# Patient Record
Sex: MALE
[De-identification: names, ages, dates, MRNs, and addresses within clinical notes are randomized per-mention and may not be internally consistent; named-entity substitution may affect disease eponyms.]

---

## 2018-08-01 PROBLEM — Z00.00 ENCOUNTER FOR PREVENTIVE HEALTH EXAMINATION: Status: ACTIVE | Noted: 2018-08-01

## 2018-08-15 ENCOUNTER — APPOINTMENT (OUTPATIENT)
Dept: SPINE | Facility: CLINIC | Age: 58
End: 2018-08-15

## 2018-08-30 ENCOUNTER — APPOINTMENT (OUTPATIENT)
Dept: NEUROSURGERY | Facility: CLINIC | Age: 58
End: 2018-08-30
Payer: COMMERCIAL

## 2018-08-30 VITALS
TEMPERATURE: 98 F | RESPIRATION RATE: 18 BRPM | SYSTOLIC BLOOD PRESSURE: 162 MMHG | OXYGEN SATURATION: 99 % | HEIGHT: 78 IN | BODY MASS INDEX: 20.25 KG/M2 | DIASTOLIC BLOOD PRESSURE: 97 MMHG | WEIGHT: 175 LBS | HEART RATE: 58 BPM

## 2018-08-30 DIAGNOSIS — Z82.49 FAMILY HISTORY OF ISCHEMIC HEART DISEASE AND OTHER DISEASES OF THE CIRCULATORY SYSTEM: ICD-10-CM

## 2018-08-30 DIAGNOSIS — Z80.9 FAMILY HISTORY OF MALIGNANT NEOPLASM, UNSPECIFIED: ICD-10-CM

## 2018-08-30 DIAGNOSIS — Z78.9 OTHER SPECIFIED HEALTH STATUS: ICD-10-CM

## 2018-08-30 DIAGNOSIS — Z82.61 FAMILY HISTORY OF ARTHRITIS: ICD-10-CM

## 2018-08-30 DIAGNOSIS — Z87.891 PERSONAL HISTORY OF NICOTINE DEPENDENCE: ICD-10-CM

## 2018-08-30 DIAGNOSIS — Z86.79 PERSONAL HISTORY OF OTHER DISEASES OF THE CIRCULATORY SYSTEM: ICD-10-CM

## 2018-08-30 PROCEDURE — 99245 OFF/OP CONSLTJ NEW/EST HI 55: CPT

## 2018-08-31 PROBLEM — Z78.9 DOES NOT USE ILLICIT DRUGS: Status: ACTIVE | Noted: 2018-08-30

## 2018-08-31 PROBLEM — Z82.49 FAMILY HISTORY OF HEART FAILURE: Status: ACTIVE | Noted: 2018-08-30

## 2018-08-31 PROBLEM — Z82.61 FAMILY HISTORY OF ARTHRITIS: Status: ACTIVE | Noted: 2018-08-30

## 2018-08-31 PROBLEM — Z87.891 FORMER SMOKER: Status: ACTIVE | Noted: 2018-08-30

## 2018-08-31 PROBLEM — Z80.9 FAMILY HISTORY OF MALIGNANT NEOPLASM: Status: ACTIVE | Noted: 2018-08-30

## 2018-08-31 PROBLEM — Z86.79 HISTORY OF HYPERTENSION: Status: RESOLVED | Noted: 2018-08-30 | Resolved: 2018-08-31

## 2018-08-31 RX ORDER — METOPROLOL SUCCINATE 100 MG/1
100 TABLET, EXTENDED RELEASE ORAL
Refills: 0 | Status: ACTIVE | COMMUNITY

## 2018-08-31 RX ORDER — ALPRAZOLAM 0.5 MG/1
0.5 TABLET ORAL
Refills: 0 | Status: ACTIVE | COMMUNITY

## 2018-08-31 RX ORDER — ASPIRIN ENTERIC COATED TABLETS 81 MG 81 MG/1
81 TABLET, DELAYED RELEASE ORAL
Refills: 0 | Status: ACTIVE | COMMUNITY

## 2018-09-24 ENCOUNTER — APPOINTMENT (OUTPATIENT)
Dept: OTOLARYNGOLOGY | Facility: CLINIC | Age: 58
End: 2018-09-24
Payer: COMMERCIAL

## 2018-09-24 VITALS
BODY MASS INDEX: 23.7 KG/M2 | HEART RATE: 67 BPM | OXYGEN SATURATION: 99 % | SYSTOLIC BLOOD PRESSURE: 180 MMHG | WEIGHT: 175 LBS | HEIGHT: 72 IN | DIASTOLIC BLOOD PRESSURE: 110 MMHG

## 2018-09-24 PROCEDURE — 31231 NASAL ENDOSCOPY DX: CPT

## 2018-09-24 PROCEDURE — 99204 OFFICE O/P NEW MOD 45 MIN: CPT | Mod: 25

## 2018-10-08 ENCOUNTER — FORM ENCOUNTER (OUTPATIENT)
Age: 58
End: 2018-10-08

## 2018-10-09 ENCOUNTER — OUTPATIENT (OUTPATIENT)
Dept: OUTPATIENT SERVICES | Facility: HOSPITAL | Age: 58
LOS: 1 days | End: 2018-10-09
Payer: COMMERCIAL

## 2018-10-09 ENCOUNTER — APPOINTMENT (OUTPATIENT)
Dept: CT IMAGING | Facility: HOSPITAL | Age: 58
End: 2018-10-09

## 2018-10-09 PROCEDURE — 70486 CT MAXILLOFACIAL W/O DYE: CPT

## 2018-10-09 PROCEDURE — 70486 CT MAXILLOFACIAL W/O DYE: CPT | Mod: 26

## 2018-11-09 RX ORDER — RAMIPRIL 5 MG/1
CAPSULE ORAL
Refills: 0 | Status: ACTIVE | COMMUNITY

## 2018-11-09 RX ORDER — HYDROCHLOROTHIAZIDE 12.5 MG/1
TABLET ORAL
Refills: 0 | Status: ACTIVE | COMMUNITY

## 2018-11-12 RX ORDER — INFLUENZA VIRUS VACCINE 15; 15; 15; 15 UG/.5ML; UG/.5ML; UG/.5ML; UG/.5ML
0.5 SUSPENSION INTRAMUSCULAR ONCE
Refills: 0 | Status: DISCONTINUED | OUTPATIENT
Start: 2018-11-13 | End: 2018-11-15

## 2018-11-13 ENCOUNTER — RESULT REVIEW (OUTPATIENT)
Age: 58
End: 2018-11-13

## 2018-11-13 ENCOUNTER — INPATIENT (INPATIENT)
Facility: HOSPITAL | Age: 58
LOS: 1 days | Discharge: HOME | DRG: 615 | End: 2018-11-15
Attending: NEUROLOGICAL SURGERY | Admitting: NEUROLOGICAL SURGERY
Payer: COMMERCIAL

## 2018-11-13 ENCOUNTER — APPOINTMENT (OUTPATIENT)
Dept: OTOLARYNGOLOGY | Facility: HOSPITAL | Age: 58
End: 2018-11-13

## 2018-11-13 VITALS
DIASTOLIC BLOOD PRESSURE: 89 MMHG | TEMPERATURE: 97 F | HEIGHT: 70 IN | HEART RATE: 63 BPM | SYSTOLIC BLOOD PRESSURE: 158 MMHG | WEIGHT: 177.47 LBS | RESPIRATION RATE: 18 BRPM

## 2018-11-13 DIAGNOSIS — D35.2 BENIGN NEOPLASM OF PITUITARY GLAND: ICD-10-CM

## 2018-11-13 DIAGNOSIS — Z90.89 ACQUIRED ABSENCE OF OTHER ORGANS: Chronic | ICD-10-CM

## 2018-11-13 DIAGNOSIS — Z95.1 PRESENCE OF AORTOCORONARY BYPASS GRAFT: Chronic | ICD-10-CM

## 2018-11-13 LAB
ALBUMIN SERPL ELPH-MCNC: 4.3 G/DL — SIGNIFICANT CHANGE UP (ref 3.3–5)
ALP SERPL-CCNC: 62 U/L — SIGNIFICANT CHANGE UP (ref 40–120)
ALT FLD-CCNC: 27 U/L — SIGNIFICANT CHANGE UP (ref 10–45)
ANION GAP SERPL CALC-SCNC: 16 MMOL/L — SIGNIFICANT CHANGE UP (ref 5–17)
AST SERPL-CCNC: 24 U/L — SIGNIFICANT CHANGE UP (ref 10–40)
BASOPHILS NFR BLD AUTO: 0.2 % — SIGNIFICANT CHANGE UP (ref 0–2)
BILIRUB SERPL-MCNC: 0.6 MG/DL — SIGNIFICANT CHANGE UP (ref 0.2–1.2)
BUN SERPL-MCNC: 14 MG/DL — SIGNIFICANT CHANGE UP (ref 7–23)
CALCIUM SERPL-MCNC: 8.9 MG/DL — SIGNIFICANT CHANGE UP (ref 8.4–10.5)
CHLORIDE SERPL-SCNC: 97 MMOL/L — SIGNIFICANT CHANGE UP (ref 96–108)
CO2 SERPL-SCNC: 24 MMOL/L — SIGNIFICANT CHANGE UP (ref 22–31)
CREAT SERPL-MCNC: 0.85 MG/DL — SIGNIFICANT CHANGE UP (ref 0.5–1.3)
EOSINOPHIL NFR BLD AUTO: 0.5 % — SIGNIFICANT CHANGE UP (ref 0–6)
GLUCOSE BLDC GLUCOMTR-MCNC: 152 MG/DL — HIGH (ref 70–99)
GLUCOSE SERPL-MCNC: 141 MG/DL — HIGH (ref 70–99)
HCT VFR BLD CALC: 35.7 % — LOW (ref 39–50)
HGB BLD-MCNC: 12.4 G/DL — LOW (ref 13–17)
INR BLD: 1.1 — SIGNIFICANT CHANGE UP (ref 0.88–1.16)
LYMPHOCYTES # BLD AUTO: 27.3 % — SIGNIFICANT CHANGE UP (ref 13–44)
MCHC RBC-ENTMCNC: 29.8 PG — SIGNIFICANT CHANGE UP (ref 27–34)
MCHC RBC-ENTMCNC: 34.7 G/DL — SIGNIFICANT CHANGE UP (ref 32–36)
MCV RBC AUTO: 85.8 FL — SIGNIFICANT CHANGE UP (ref 80–100)
MONOCYTES NFR BLD AUTO: 7.4 % — SIGNIFICANT CHANGE UP (ref 2–14)
NEUTROPHILS NFR BLD AUTO: 64.6 % — SIGNIFICANT CHANGE UP (ref 43–77)
PLATELET # BLD AUTO: 164 K/UL — SIGNIFICANT CHANGE UP (ref 150–400)
POTASSIUM SERPL-MCNC: 3.8 MMOL/L — SIGNIFICANT CHANGE UP (ref 3.5–5.3)
POTASSIUM SERPL-SCNC: 3.8 MMOL/L — SIGNIFICANT CHANGE UP (ref 3.5–5.3)
PROT SERPL-MCNC: 7 G/DL — SIGNIFICANT CHANGE UP (ref 6–8.3)
PROTHROM AB SERPL-ACNC: 12.5 SEC — SIGNIFICANT CHANGE UP (ref 10–12.9)
RBC # BLD: 4.16 M/UL — LOW (ref 4.2–5.8)
RBC # FLD: 11.6 % — SIGNIFICANT CHANGE UP (ref 10.3–16.9)
SODIUM SERPL-SCNC: 137 MMOL/L — SIGNIFICANT CHANGE UP (ref 135–145)
WBC # BLD: 4.4 K/UL — SIGNIFICANT CHANGE UP (ref 3.8–10.5)
WBC # FLD AUTO: 4.4 K/UL — SIGNIFICANT CHANGE UP (ref 3.8–10.5)

## 2018-11-13 PROCEDURE — 62165 REMOVE PITUIT TUMOR W/SCOPE: CPT | Mod: 80

## 2018-11-13 PROCEDURE — 62165 REMOVE PITUIT TUMOR W/SCOPE: CPT | Mod: 62

## 2018-11-13 PROCEDURE — 70551 MRI BRAIN STEM W/O DYE: CPT | Mod: 26

## 2018-11-13 PROCEDURE — 61782 SCAN PROC CRANIAL EXTRA: CPT

## 2018-11-13 PROCEDURE — 61781 SCAN PROC CRANIAL INTRA: CPT | Mod: 80

## 2018-11-13 PROCEDURE — 61781 SCAN PROC CRANIAL INTRA: CPT

## 2018-11-13 RX ORDER — PANTOPRAZOLE SODIUM 20 MG/1
40 TABLET, DELAYED RELEASE ORAL
Refills: 0 | Status: DISCONTINUED | OUTPATIENT
Start: 2018-11-13 | End: 2018-11-15

## 2018-11-13 RX ORDER — FAMOTIDINE 10 MG/ML
20 INJECTION INTRAVENOUS DAILY
Refills: 0 | Status: DISCONTINUED | OUTPATIENT
Start: 2018-11-13 | End: 2018-11-13

## 2018-11-13 RX ORDER — DEXTROSE MONOHYDRATE, SODIUM CHLORIDE, AND POTASSIUM CHLORIDE 50; .745; 4.5 G/1000ML; G/1000ML; G/1000ML
1000 INJECTION, SOLUTION INTRAVENOUS
Refills: 0 | Status: DISCONTINUED | OUTPATIENT
Start: 2018-11-13 | End: 2018-11-14

## 2018-11-13 RX ORDER — DEXTROSE 50 % IN WATER 50 %
25 SYRINGE (ML) INTRAVENOUS ONCE
Refills: 0 | Status: DISCONTINUED | OUTPATIENT
Start: 2018-11-13 | End: 2018-11-15

## 2018-11-13 RX ORDER — KETOROLAC TROMETHAMINE 30 MG/ML
15 SYRINGE (ML) INJECTION EVERY 4 HOURS
Refills: 0 | Status: DISCONTINUED | OUTPATIENT
Start: 2018-11-13 | End: 2018-11-15

## 2018-11-13 RX ORDER — ACETAMINOPHEN 500 MG
650 TABLET ORAL EVERY 6 HOURS
Refills: 0 | Status: DISCONTINUED | OUTPATIENT
Start: 2018-11-13 | End: 2018-11-15

## 2018-11-13 RX ORDER — SENNA PLUS 8.6 MG/1
2 TABLET ORAL AT BEDTIME
Refills: 0 | Status: DISCONTINUED | OUTPATIENT
Start: 2018-11-13 | End: 2018-11-15

## 2018-11-13 RX ORDER — DEXTROSE 50 % IN WATER 50 %
12.5 SYRINGE (ML) INTRAVENOUS ONCE
Refills: 0 | Status: DISCONTINUED | OUTPATIENT
Start: 2018-11-13 | End: 2018-11-15

## 2018-11-13 RX ORDER — DEXTROSE 50 % IN WATER 50 %
15 SYRINGE (ML) INTRAVENOUS ONCE
Refills: 0 | Status: DISCONTINUED | OUTPATIENT
Start: 2018-11-13 | End: 2018-11-15

## 2018-11-13 RX ORDER — HYDROCORTISONE 20 MG
TABLET ORAL
Refills: 0 | Status: DISCONTINUED | OUTPATIENT
Start: 2018-11-13 | End: 2018-11-15

## 2018-11-13 RX ORDER — CEFAZOLIN SODIUM 1 G
VIAL (EA) INJECTION
Refills: 0 | Status: DISCONTINUED | OUTPATIENT
Start: 2018-11-13 | End: 2018-11-15

## 2018-11-13 RX ORDER — SODIUM CHLORIDE 9 MG/ML
1000 INJECTION, SOLUTION INTRAVENOUS
Refills: 0 | Status: DISCONTINUED | OUTPATIENT
Start: 2018-11-13 | End: 2018-11-15

## 2018-11-13 RX ORDER — METOPROLOL TARTRATE 50 MG
100 TABLET ORAL DAILY
Refills: 0 | Status: DISCONTINUED | OUTPATIENT
Start: 2018-11-13 | End: 2018-11-15

## 2018-11-13 RX ORDER — FENTANYL CITRATE 50 UG/ML
50 INJECTION INTRAVENOUS ONCE
Refills: 0 | Status: DISCONTINUED | OUTPATIENT
Start: 2018-11-13 | End: 2018-11-14

## 2018-11-13 RX ORDER — HYDROCORTISONE 20 MG
50 TABLET ORAL EVERY 8 HOURS
Refills: 0 | Status: DISCONTINUED | OUTPATIENT
Start: 2018-11-14 | End: 2018-11-15

## 2018-11-13 RX ORDER — HYDROCORTISONE 20 MG
25 TABLET ORAL EVERY 8 HOURS
Refills: 0 | Status: DISCONTINUED | OUTPATIENT
Start: 2018-11-15 | End: 2018-11-15

## 2018-11-13 RX ORDER — CEFAZOLIN SODIUM 1 G
1000 VIAL (EA) INJECTION EVERY 8 HOURS
Refills: 0 | Status: DISCONTINUED | OUTPATIENT
Start: 2018-11-14 | End: 2018-11-15

## 2018-11-13 RX ORDER — CEFAZOLIN SODIUM 1 G
1000 VIAL (EA) INJECTION ONCE
Refills: 0 | Status: COMPLETED | OUTPATIENT
Start: 2018-11-13 | End: 2018-11-13

## 2018-11-13 RX ORDER — INSULIN LISPRO 100/ML
VIAL (ML) SUBCUTANEOUS EVERY 6 HOURS
Refills: 0 | Status: DISCONTINUED | OUTPATIENT
Start: 2018-11-13 | End: 2018-11-15

## 2018-11-13 RX ORDER — ATORVASTATIN CALCIUM 80 MG/1
40 TABLET, FILM COATED ORAL AT BEDTIME
Refills: 0 | Status: DISCONTINUED | OUTPATIENT
Start: 2018-11-13 | End: 2018-11-15

## 2018-11-13 RX ORDER — HYDROCORTISONE 20 MG
100 TABLET ORAL EVERY 8 HOURS
Refills: 0 | Status: COMPLETED | OUTPATIENT
Start: 2018-11-13 | End: 2018-11-14

## 2018-11-13 RX ORDER — DOCUSATE SODIUM 100 MG
100 CAPSULE ORAL
Refills: 0 | Status: DISCONTINUED | OUTPATIENT
Start: 2018-11-13 | End: 2018-11-15

## 2018-11-13 RX ORDER — ALPRAZOLAM 0.25 MG
0.5 TABLET ORAL AT BEDTIME
Refills: 0 | Status: DISCONTINUED | OUTPATIENT
Start: 2018-11-13 | End: 2018-11-15

## 2018-11-13 RX ORDER — LISINOPRIL 2.5 MG/1
5 TABLET ORAL DAILY
Refills: 0 | Status: DISCONTINUED | OUTPATIENT
Start: 2018-11-13 | End: 2018-11-14

## 2018-11-13 RX ORDER — HYDROMORPHONE HYDROCHLORIDE 2 MG/ML
0.5 INJECTION INTRAMUSCULAR; INTRAVENOUS; SUBCUTANEOUS
Refills: 0 | Status: DISCONTINUED | OUTPATIENT
Start: 2018-11-13 | End: 2018-11-13

## 2018-11-13 RX ORDER — GLUCAGON INJECTION, SOLUTION 0.5 MG/.1ML
1 INJECTION, SOLUTION SUBCUTANEOUS ONCE
Refills: 0 | Status: DISCONTINUED | OUTPATIENT
Start: 2018-11-13 | End: 2018-11-15

## 2018-11-13 RX ADMIN — HYDROMORPHONE HYDROCHLORIDE 0.5 MILLIGRAM(S): 2 INJECTION INTRAMUSCULAR; INTRAVENOUS; SUBCUTANEOUS at 20:10

## 2018-11-13 RX ADMIN — Medication 15 MILLIGRAM(S): at 22:56

## 2018-11-13 RX ADMIN — Medication 100 MILLIGRAM(S): at 21:52

## 2018-11-13 RX ADMIN — LISINOPRIL 5 MILLIGRAM(S): 2.5 TABLET ORAL at 23:47

## 2018-11-13 RX ADMIN — Medication 100 MILLIGRAM(S): at 23:46

## 2018-11-13 RX ADMIN — Medication 15 MILLIGRAM(S): at 23:15

## 2018-11-13 RX ADMIN — Medication 2: at 23:07

## 2018-11-13 RX ADMIN — ATORVASTATIN CALCIUM 40 MILLIGRAM(S): 80 TABLET, FILM COATED ORAL at 23:47

## 2018-11-13 RX ADMIN — DEXTROSE MONOHYDRATE, SODIUM CHLORIDE, AND POTASSIUM CHLORIDE 75 MILLILITER(S): 50; .745; 4.5 INJECTION, SOLUTION INTRAVENOUS at 21:23

## 2018-11-13 RX ADMIN — HYDROMORPHONE HYDROCHLORIDE 0.5 MILLIGRAM(S): 2 INJECTION INTRAMUSCULAR; INTRAVENOUS; SUBCUTANEOUS at 19:44

## 2018-11-13 NOTE — H&P PST ADULT - PMH
CAD (coronary artery disease) of artery bypass graft    HLD (hyperlipidemia)    HTN (hypertension)    Pituitary macroadenoma

## 2018-11-13 NOTE — H&P PST ADULT - HISTORY OF PRESENT ILLNESS
58 year old male, had recent episode of L facial palsy / Bell's Palsy, work-up found an incidental sellar mass, hormonal work-up negative. Had pre-op visual fields.

## 2018-11-13 NOTE — PROGRESS NOTE ADULT - SUBJECTIVE AND OBJECTIVE BOX
NEUROSURGERY POST OP NOTE:    POD# 0 S/P transphenoidal pituitary tumor resection	    S: Patient reports 8/10 severity headache minimally improved by pain medication. He otherwise denies any visual changes, nasal discharge, extremity weakness, excessive thirst.      T(C): 35.6 (11-13-18 @ 18:10), Max: 35.9 (11-13-18 @ 13:04)  HR: 76 (11-13-18 @ 22:00) (63 - 82)  BP: 150/94 (11-13-18 @ 22:00) (146/93 - 163/93)  RR: 13 (11-13-18 @ 22:00) (4 - 18)  SpO2: 96% (11-13-18 @ 22:00) (96% - 100%)      11-13-18 @ 07:01  -  11-13-18 @ 22:19  --------------------------------------------------------  IN: 300 mL / OUT: 605 mL / NET: -305 mL        acetaminophen   Tablet .. 650 milliGRAM(s) Oral every 6 hours PRN  ALPRAZolam 0.5 milliGRAM(s) Oral at bedtime  atorvastatin 40 milliGRAM(s) Oral at bedtime  ceFAZolin   IVPB      famotidine    Tablet 20 milliGRAM(s) Oral daily  fentaNYL    Injectable 50 MICROGram(s) IV Push once  hydrocortisone sodium succinate Injectable   IV Push   hydrocortisone sodium succinate Injectable 100 milliGRAM(s) IV Push every 8 hours  influenza   Vaccine 0.5 milliLiter(s) IntraMuscular once  ketorolac   Injectable 15 milliGRAM(s) IV Push every 4 hours PRN  lisinopril 5 milliGRAM(s) Oral daily  metoprolol succinate  milliGRAM(s) Oral daily  sodium chloride 0.9% with potassium chloride 20 mEq/L 1000 milliLiter(s) IV Continuous <Continuous>                          12.4   4.4   )-----------( 164      ( 13 Nov 2018 18:47 )             35.7       137  |  97  |  14  ----------------------------<  141<H>  3.8   |  24  |  0.85    Ca    8.9      13 Nov 2018 18:47    TPro  7.0  /  Alb  4.3  /  TBili  0.6  /  DBili  x   /  AST  24  /  ALT  27  /  AlkPhos  62  11-13      Exam: Gen: NAD, AAOx3  HEENT: PERRL. EOMI. VF intact. Right nare packing.  Neck: FROM, nontender  Lungs: Clear b/l  Heart: S1, S2. NSR.  Abd: Soft, hypoactive BS, non-tender  Exts: Pulses 2+ throughout  Neuro: CNs II-XII intact. 5/5 str x4 extremities. Sensation to LT intact. Speech clear. Following commands.      Assessment: 58y Male w/ HTN, HLD, Anxiety, h/o left side Bell's Palsy with outpatient work-up of pituitary macroadenoma now s/p transphenoidal transnasal pituitary tumor resection POD#0.      Plan:  Neuro: Neuro checks,  Pain meds PRN,  MRI Brain within 48 hours,  DI watch,  Hydrocortisone on a taper as ordered,    CV: Normotensive BP goals,  Continue home medications but hold HCTZ  Daily labs, electrolyte repletion PRN    Pulm: No IS, satting well on RA    GI: PO diet as tolerated,  PPI, stool softeners,  IVF until good PO intake,    : Morrow, monitor I&Os    ID: Ancef for duration of nasal packing,    Endo: ISS as ordered,    PPX: SCDs    Dispo: NSICU when bed available for neuro monitoring and DI watch,  PT/OT and OOB tomorrow as tolerated,  D/w Dr. Aquino

## 2018-11-13 NOTE — BRIEF OPERATIVE NOTE - PROCEDURE
Transphenoidal or transnasal resection of pituitary tumor with magnetic resonance imaging guidance  11/13/2018    Active  [id number] <<-----Click on this checkbox to enter Procedure

## 2018-11-14 LAB
ANION GAP SERPL CALC-SCNC: 17 MMOL/L — SIGNIFICANT CHANGE UP (ref 5–17)
BUN SERPL-MCNC: 15 MG/DL — SIGNIFICANT CHANGE UP (ref 7–23)
CALCIUM SERPL-MCNC: 8.9 MG/DL — SIGNIFICANT CHANGE UP (ref 8.4–10.5)
CHLORIDE SERPL-SCNC: 97 MMOL/L — SIGNIFICANT CHANGE UP (ref 96–108)
CO2 SERPL-SCNC: 21 MMOL/L — LOW (ref 22–31)
CREAT SERPL-MCNC: 0.73 MG/DL — SIGNIFICANT CHANGE UP (ref 0.5–1.3)
GLUCOSE BLDC GLUCOMTR-MCNC: 150 MG/DL — HIGH (ref 70–99)
GLUCOSE BLDC GLUCOMTR-MCNC: 175 MG/DL — HIGH (ref 70–99)
GLUCOSE SERPL-MCNC: 169 MG/DL — HIGH (ref 70–99)
HCT VFR BLD CALC: 37.6 % — LOW (ref 39–50)
HGB BLD-MCNC: 12.9 G/DL — LOW (ref 13–17)
MAGNESIUM SERPL-MCNC: 1.7 MG/DL — SIGNIFICANT CHANGE UP (ref 1.6–2.6)
MCHC RBC-ENTMCNC: 29.3 PG — SIGNIFICANT CHANGE UP (ref 27–34)
MCHC RBC-ENTMCNC: 34.3 G/DL — SIGNIFICANT CHANGE UP (ref 32–36)
MCV RBC AUTO: 85.5 FL — SIGNIFICANT CHANGE UP (ref 80–100)
PHOSPHATE SERPL-MCNC: 3.3 MG/DL — SIGNIFICANT CHANGE UP (ref 2.5–4.5)
PLATELET # BLD AUTO: 162 K/UL — SIGNIFICANT CHANGE UP (ref 150–400)
POTASSIUM SERPL-MCNC: 3.8 MMOL/L — SIGNIFICANT CHANGE UP (ref 3.5–5.3)
POTASSIUM SERPL-SCNC: 3.8 MMOL/L — SIGNIFICANT CHANGE UP (ref 3.5–5.3)
RBC # BLD: 4.4 M/UL — SIGNIFICANT CHANGE UP (ref 4.2–5.8)
RBC # FLD: 11.8 % — SIGNIFICANT CHANGE UP (ref 10.3–16.9)
SODIUM SERPL-SCNC: 135 MMOL/L — SIGNIFICANT CHANGE UP (ref 135–145)
WBC # BLD: 8.1 K/UL — SIGNIFICANT CHANGE UP (ref 3.8–10.5)
WBC # FLD AUTO: 8.1 K/UL — SIGNIFICANT CHANGE UP (ref 3.8–10.5)

## 2018-11-14 PROCEDURE — 99233 SBSQ HOSP IP/OBS HIGH 50: CPT | Mod: 24

## 2018-11-14 RX ORDER — ACETAMINOPHEN 500 MG
1000 TABLET ORAL ONCE
Refills: 0 | Status: COMPLETED | OUTPATIENT
Start: 2018-11-14 | End: 2018-11-14

## 2018-11-14 RX ORDER — SODIUM CHLORIDE 9 MG/ML
1000 INJECTION INTRAMUSCULAR; INTRAVENOUS; SUBCUTANEOUS
Refills: 0 | Status: DISCONTINUED | OUTPATIENT
Start: 2018-11-14 | End: 2018-11-15

## 2018-11-14 RX ORDER — FENTANYL CITRATE 50 UG/ML
25 INJECTION INTRAVENOUS EVERY 4 HOURS
Refills: 0 | Status: DISCONTINUED | OUTPATIENT
Start: 2018-11-14 | End: 2018-11-14

## 2018-11-14 RX ORDER — LISINOPRIL 2.5 MG/1
10 TABLET ORAL DAILY
Refills: 0 | Status: DISCONTINUED | OUTPATIENT
Start: 2018-11-15 | End: 2018-11-15

## 2018-11-14 RX ADMIN — PANTOPRAZOLE SODIUM 40 MILLIGRAM(S): 20 TABLET, DELAYED RELEASE ORAL at 07:03

## 2018-11-14 RX ADMIN — Medication 100 MILLIGRAM(S): at 07:03

## 2018-11-14 RX ADMIN — Medication 15 MILLIGRAM(S): at 18:15

## 2018-11-14 RX ADMIN — ATORVASTATIN CALCIUM 40 MILLIGRAM(S): 80 TABLET, FILM COATED ORAL at 22:27

## 2018-11-14 RX ADMIN — Medication 15 MILLIGRAM(S): at 23:00

## 2018-11-14 RX ADMIN — SENNA PLUS 2 TABLET(S): 8.6 TABLET ORAL at 22:26

## 2018-11-14 RX ADMIN — Medication 400 MILLIGRAM(S): at 13:34

## 2018-11-14 RX ADMIN — Medication 2: at 07:17

## 2018-11-14 RX ADMIN — Medication 100 MILLIGRAM(S): at 22:27

## 2018-11-14 RX ADMIN — Medication 1000 MILLIGRAM(S): at 14:04

## 2018-11-14 RX ADMIN — Medication 100 MILLIGRAM(S): at 18:11

## 2018-11-14 RX ADMIN — Medication 15 MILLIGRAM(S): at 22:27

## 2018-11-14 RX ADMIN — Medication 100 MILLIGRAM(S): at 07:02

## 2018-11-14 RX ADMIN — Medication 15 MILLIGRAM(S): at 10:40

## 2018-11-14 RX ADMIN — LISINOPRIL 5 MILLIGRAM(S): 2.5 TABLET ORAL at 07:03

## 2018-11-14 RX ADMIN — Medication 100 MILLIGRAM(S): at 14:00

## 2018-11-14 RX ADMIN — SODIUM CHLORIDE 50 MILLILITER(S): 9 INJECTION INTRAMUSCULAR; INTRAVENOUS; SUBCUTANEOUS at 22:27

## 2018-11-14 RX ADMIN — Medication 0.5 MILLIGRAM(S): at 22:26

## 2018-11-14 RX ADMIN — Medication 50 MILLIGRAM(S): at 22:27

## 2018-11-14 RX ADMIN — Medication 100 MILLIGRAM(S): at 16:00

## 2018-11-14 RX ADMIN — Medication 15 MILLIGRAM(S): at 10:25

## 2018-11-14 NOTE — PHYSICAL THERAPY INITIAL EVALUATION ADULT - MODALITIES TREATMENT COMMENTS
smile symmetrical, tongue midline, eyes open/close raise eyebrows intact. H-test and quadrant test intact.

## 2018-11-14 NOTE — PROGRESS NOTE ADULT - ASSESSMENT
58y/M with  Pituitary macroadenoma  HLD (hyperlipidemia)  HTN (hypertension)  CAD (coronary artery disease) of artery bypass graft      PLAN:   NEURO:  REHAB:  physical therapy evaluation and management    EARLY MOB:      PULM:  Room air, incentive spirometry  CARDIO:  SBP goal 100-150mm Hg  ENDO:  Blood sugar goals 140-180 mg/dL, continue insulin sliding scale  GI:  PPI for GI prophylaxis  DIET:  RENAL:    HEM/ONC:  VTE Prophylaxis:   ID: afebrile, no leukocytosis  Social: will update family    ATTENDING ATTESTATION:  I was physically present for the key portions of the evaluation and management (E/M) service provided.  I agree with the above history, physical and plan, which I have reviewed and edited where appropriate.    Patient at high risk for neurological deterioration or death due to:  ICU delirium, aspiration PNA, DVT / PE.  Critical care time, excluding procedures: 60 minutes spent on total encounter, more than 50% of the visit was spent counseling and/or coordinating care by the attending physician.     Plan discussed with RN, house staff. 58y/M with  1.  Pituitary macroadenoma s/p transphenoidal resection (11/13/2018, Dr. Aquino, Dr. Wiley)  2.  Hypertension dyslipidemia   3.  CAD, s/p CABG    PLAN:   NEURO: neurochecks q4h, PRN pain meds with tylenol, DI watch  s/p resection of pituitary mass:  f/u final histopathology; MRI on stepdown, hydrocortisone taper as per NS  REHAB:  physical therapy evaluation and management    EARLY MOB:  OOB to chair    PULM:  PRN O2 support to keep sats >/=92%, skull base precautions: no nose blowing, drinking with a straw, or bending below waist; NO incentive spirometry   CARDIO:  SBP goal 100-150mm Hg, continue lisinopril, metoprolol  ENDO:  Blood sugar goals 140-180 mg/dL, continue insulin sliding scale  GI:  PPI for GI prophylaxis while on steroids  DIET: advance as tolerated  RENAL:  NS@50cc/hr  HEM/ONC: Hb stable  VTE Prophylaxis: SCDs, no DVT chemoprophylaxis for now as patient is high risk for bleed (fresh post-op), baseline LE Doppler for DVT suspected on admission (pit adenoma)  ID: afebrile, no leukocytosis  Social: will update family    ATTENDING ATTESTATION:  I was physically present for the key portions of the evaluation and management (E/M) service provided.  I agree with the above history, physical and plan which I have reviewed and edited where appropriate.     Patient not at high risk for neurologic deterioration / death.  Time spent on this noncritically ill patient: 45 minutes spent on total encounter, more than 50% of the visit was spent counseling and/or coordinating care by the attending physician.    Plan discussed with RN, house staff. 58y/M with  1.  Pituitary macroadenoma s/p transphenoidal resection (11/13/2018, Dr. Aquino, Dr. Wiley)  2.  Hypertension dyslipidemia   3.  CAD, s/p CABG    PLAN:   NEURO: neurochecks q4h, PRN pain meds with tylenol, DI watch  s/p resection of pituitary mass:  f/u final histopathology; MRI on stepdown, hydrocortisone taper as per NS  REHAB:  physical therapy evaluation and management    EARLY MOB:  OOB to chair    PULM:  PRN O2 support to keep sats >/=92%, skull base precautions: no nose blowing, drinking with a straw, or bending below waist; NO incentive spirometry   CARDIO:  SBP goal 100-150mm Hg, continue lisinopril, metoprolol  ENDO:  Blood sugar goals 140-180 mg/dL, continue insulin sliding scale  GI:  PPI for GI prophylaxis while on steroids  DIET: regular diet  RENAL:  IVL  HEM/ONC: Hb stable  VTE Prophylaxis: SCDs, no DVT chemoprophylaxis for now as patient is high risk for bleed (fresh post-op), baseline LE Doppler for DVT suspected on admission (pit adenoma)  ID: afebrile, no leukocytosis  Social: will update family    ATTENDING ATTESTATION:  I was physically present for the key portions of the evaluation and management (E/M) service provided.  I agree with the above history, physical and plan which I have reviewed and edited where appropriate.     Patient not at high risk for neurologic deterioration / death.  Time spent on this noncritically ill patient: 45 minutes spent on total encounter, more than 50% of the visit was spent counseling and/or coordinating care by the attending physician.    Plan discussed with RN, house staff.

## 2018-11-14 NOTE — PROGRESS NOTE ADULT - SUBJECTIVE AND OBJECTIVE BOX
HPI:  58 year old male, had recent episode of L facial palsy / Bell's Palsy, work-up found an incidental sellar mass, hormonal work-up negative. Had pre-op visual fields. (13 Nov 2018 15:38)    OVERNIGHT EVENTS:    11/14: HA improved with IV toradol, didn't tolerate dilaudid and refused fentanyl; started fioricet prn today for HA     Vital Signs Last 24 Hrs  T(C): 35.7 (14 Nov 2018 05:00), Max: 35.9 (13 Nov 2018 13:04)  T(F): 96.3 (14 Nov 2018 05:00), Max: 96.6 (13 Nov 2018 13:04)  HR: 86 (14 Nov 2018 09:04) (63 - 90)  BP: 159/93 (14 Nov 2018 09:04) (134/97 - 163/93)  BP(mean): 115 (14 Nov 2018 09:04) (110 - 129)  RR: 14 (14 Nov 2018 09:04) (4 - 18)  SpO2: 97% (14 Nov 2018 09:04) (95% - 100%)    I&O's Summary    13 Nov 2018 07:01  -  14 Nov 2018 07:00  --------------------------------------------------------  IN: 950 mL / OUT: 1125 mL / NET: -175 mL    14 Nov 2018 07:01  -  14 Nov 2018 10:35  --------------------------------------------------------  IN: 75 mL / OUT: 40 mL / NET: 35 mL        PHYSICAL EXAM:  AAOX3. Verbal function intact; R merocel packing intact.   No CSF leakage noted   on Supplemental O2 stat, in no distress   Cranial Nerves: II-XII intact  Motor: 5/5 power in b/l UE and LE  Sensation: intact to touch in all extremities  symmetric sensation on both sides of face.   Pronator Drift: none  Morrow a-line removed, voiding  abd: non tender       TUBES/LINES:  [] Morrow  [] Lumbar Drain  [] Wound Drains  [] Others      DIET:  [] NPO  [x] Mechanical  [] Tube feeds    LABS:                        12.9   8.1   )-----------( 162      ( 14 Nov 2018 06:08 )             37.6     11-14    135  |  97  |  15  ----------------------------<  169<H>  3.8   |  21<L>  |  0.73    Ca    8.9      14 Nov 2018 06:08  Phos  3.3     11-14  Mg     1.7     11-14    TPro  7.0  /  Alb  4.3  /  TBili  0.6  /  DBili  x   /  AST  24  /  ALT  27  /  AlkPhos  62  11-13    PT/INR - ( 13 Nov 2018 18:47 )   PT: 12.5 sec;   INR: 1.10         CAPILLARY BLOOD GLUCOSE      POCT Blood Glucose.: 175 mg/dL (14 Nov 2018 07:13)  POCT Blood Glucose.: 152 mg/dL (13 Nov 2018 22:53)      Drug Levels: [] N/A    CSF Analysis: [] N/A      Allergies    No Known Allergies    Intolerances      MEDICATIONS:  Antibiotics:  ceFAZolin   IVPB      ceFAZolin   IVPB 1000 milliGRAM(s) IV Intermittent every 8 hours    Neuro:  acetaminophen   Tablet .. 650 milliGRAM(s) Oral every 6 hours PRN  acetaminophen  IVPB .. 1000 milliGRAM(s) IV Intermittent once  ALPRAZolam 0.5 milliGRAM(s) Oral at bedtime  fentaNYL    Injectable 25 MICROGram(s) IV Push every 4 hours PRN  ketorolac   Injectable 15 milliGRAM(s) IV Push every 4 hours PRN    Anticoagulation:    OTHER:  atorvastatin 40 milliGRAM(s) Oral at bedtime  dextrose 40% Gel 15 Gram(s) Oral once PRN  dextrose 50% Injectable 12.5 Gram(s) IV Push once  dextrose 50% Injectable 25 Gram(s) IV Push once  dextrose 50% Injectable 25 Gram(s) IV Push once  docusate sodium 100 milliGRAM(s) Oral two times a day  glucagon  Injectable 1 milliGRAM(s) IntraMuscular once PRN  hydrocortisone sodium succinate Injectable   IV Push   hydrocortisone sodium succinate Injectable 100 milliGRAM(s) IV Push every 8 hours  hydrocortisone sodium succinate Injectable 50 milliGRAM(s) IV Push every 8 hours  influenza   Vaccine 0.5 milliLiter(s) IntraMuscular once  insulin lispro (HumaLOG) corrective regimen sliding scale   SubCutaneous every 6 hours  lisinopril 5 milliGRAM(s) Oral daily  metoprolol succinate  milliGRAM(s) Oral daily  pantoprazole    Tablet 40 milliGRAM(s) Oral before breakfast  senna 2 Tablet(s) Oral at bedtime    IVF:  dextrose 5%. 1000 milliLiter(s) IV Continuous <Continuous>  sodium chloride 0.9%. 1000 milliLiter(s) IV Continuous <Continuous>    CULTURES:    RADIOLOGY & ADDITIONAL TESTS:      ASSESSMENT:  58y Male s/p transnasal tsp resection of pituitary tumor POD # 1   D35.2  Handoff  Pituitary macroadenoma  HLD (hyperlipidemia)  HTN (hypertension)  CAD (coronary artery disease) of artery bypass graft  Pituitary adenoma  Pituitary adenoma  Pituitary macroadenoma  Transphenoidal or transnasal resection of pituitary tumor with magnetic resonance imaging guidance  S/P CABG x 4  History of tonsillectomy  S/P CABG x 3      PLAN:  NEURO:  - neurocheck q 4   - vitals q 4  - Post op MRI in 48 hours   - Pituitary tsp precaution:  no straws, no incentive spirometer, no sneezing with mouth closed, no straining with BM  - Monitor UO and serum Na+ level   - pain control prn, patient doesn't tolerate opioid/narcotics  - Acetaminophen/fioricet and toradol prn   - OOB/Ambulate/PT/OT today   CARDIOVASCULAR:  - normotensive   PULMONARY:    RENAL:  - UO stable so far, normal Na+ level   GI:    HEME:    ID:    ENDO:    DVT PROPHYLAXIS:  [] Venodynes                                [] Heparin/Lovenox    DISPOSITION: HPI:  58 year old male, had recent episode of L facial palsy / Bell's Palsy, work-up found an incidental sellar mass, hormonal work-up negative. Had pre-op visual fields. (13 Nov 2018 15:38)    OVERNIGHT EVENTS:    11/14: HA improved with IV toradol, didn't tolerate dilaudid and refused fentanyl; started fioricet prn today for HA     Vital Signs Last 24 Hrs  T(C): 35.7 (14 Nov 2018 05:00), Max: 35.9 (13 Nov 2018 13:04)  T(F): 96.3 (14 Nov 2018 05:00), Max: 96.6 (13 Nov 2018 13:04)  HR: 86 (14 Nov 2018 09:04) (63 - 90)  BP: 159/93 (14 Nov 2018 09:04) (134/97 - 163/93)  BP(mean): 115 (14 Nov 2018 09:04) (110 - 129)  RR: 14 (14 Nov 2018 09:04) (4 - 18)  SpO2: 97% (14 Nov 2018 09:04) (95% - 100%)    I&O's Summary    13 Nov 2018 07:01  -  14 Nov 2018 07:00  --------------------------------------------------------  IN: 950 mL / OUT: 1125 mL / NET: -175 mL    14 Nov 2018 07:01  -  14 Nov 2018 10:35  --------------------------------------------------------  IN: 75 mL / OUT: 40 mL / NET: 35 mL        PHYSICAL EXAM:  AAOX3. Verbal function intact; R merocel packing intact.   No CSF leakage noted   on Supplemental O2 stat, in no distress   Cranial Nerves: II-XII intact  Motor: 5/5 power in b/l UE and LE  Sensation: intact to touch in all extremities  symmetric sensation on both sides of face.   Pronator Drift: none  Morrow a-line removed, voiding  abd: non tender       TUBES/LINES:  [] Morrow  [] Lumbar Drain  [] Wound Drains  [] Others      DIET:  [] NPO  [x] Mechanical  [] Tube feeds    LABS:                        12.9   8.1   )-----------( 162      ( 14 Nov 2018 06:08 )             37.6     11-14    135  |  97  |  15  ----------------------------<  169<H>  3.8   |  21<L>  |  0.73    Ca    8.9      14 Nov 2018 06:08  Phos  3.3     11-14  Mg     1.7     11-14    TPro  7.0  /  Alb  4.3  /  TBili  0.6  /  DBili  x   /  AST  24  /  ALT  27  /  AlkPhos  62  11-13    PT/INR - ( 13 Nov 2018 18:47 )   PT: 12.5 sec;   INR: 1.10         CAPILLARY BLOOD GLUCOSE      POCT Blood Glucose.: 175 mg/dL (14 Nov 2018 07:13)  POCT Blood Glucose.: 152 mg/dL (13 Nov 2018 22:53)      Drug Levels: [] N/A    CSF Analysis: [] N/A      Allergies    No Known Allergies    Intolerances      MEDICATIONS:  Antibiotics:  ceFAZolin   IVPB      ceFAZolin   IVPB 1000 milliGRAM(s) IV Intermittent every 8 hours    Neuro:  acetaminophen   Tablet .. 650 milliGRAM(s) Oral every 6 hours PRN  acetaminophen  IVPB .. 1000 milliGRAM(s) IV Intermittent once  ALPRAZolam 0.5 milliGRAM(s) Oral at bedtime  fentaNYL    Injectable 25 MICROGram(s) IV Push every 4 hours PRN  ketorolac   Injectable 15 milliGRAM(s) IV Push every 4 hours PRN    Anticoagulation:    OTHER:  atorvastatin 40 milliGRAM(s) Oral at bedtime  dextrose 40% Gel 15 Gram(s) Oral once PRN  dextrose 50% Injectable 12.5 Gram(s) IV Push once  dextrose 50% Injectable 25 Gram(s) IV Push once  dextrose 50% Injectable 25 Gram(s) IV Push once  docusate sodium 100 milliGRAM(s) Oral two times a day  glucagon  Injectable 1 milliGRAM(s) IntraMuscular once PRN  hydrocortisone sodium succinate Injectable   IV Push   hydrocortisone sodium succinate Injectable 100 milliGRAM(s) IV Push every 8 hours  hydrocortisone sodium succinate Injectable 50 milliGRAM(s) IV Push every 8 hours  influenza   Vaccine 0.5 milliLiter(s) IntraMuscular once  insulin lispro (HumaLOG) corrective regimen sliding scale   SubCutaneous every 6 hours  lisinopril 5 milliGRAM(s) Oral daily  metoprolol succinate  milliGRAM(s) Oral daily  pantoprazole    Tablet 40 milliGRAM(s) Oral before breakfast  senna 2 Tablet(s) Oral at bedtime    IVF:  dextrose 5%. 1000 milliLiter(s) IV Continuous <Continuous>  sodium chloride 0.9%. 1000 milliLiter(s) IV Continuous <Continuous>    CULTURES:    RADIOLOGY & ADDITIONAL TESTS:      ASSESSMENT:  58y Male s/p transnasal tsp resection of pituitary tumor POD # 1   D35.2  Handoff  Pituitary macroadenoma  HLD (hyperlipidemia)  HTN (hypertension)  CAD (coronary artery disease) of artery bypass graft  Pituitary adenoma  Pituitary adenoma  Pituitary macroadenoma  Transphenoidal or transnasal resection of pituitary tumor with magnetic resonance imaging guidance  S/P CABG x 4  History of tonsillectomy  S/P CABG x 3      PLAN:  NEURO:  - neurocheck q 4   - vitals q 4  - Post op MRI in 48 hours   - Pituitary tsp precaution:  no straws, no incentive spirometer, no sneezing with mouth closed, no straining with BM  - Monitor UO and serum Na+ level   - pain control prn, patient doesn't tolerate opioid/narcotics  - Acetaminophen/fioricet and toradol prn   - OOB/Ambulate/PT/OT today   CARDIOVASCULAR:  - normotensive   PULMONARY:  - satting well   RENAL:  - UO stable so far, normal Na+ level   GI:  - adat  HEME:  - H&H stable   ID:  - afebrile, no leukocytosis   ENDO:  - ISS   DVT PROPHYLAXIS:  [x] Venodynes                                [] Heparin/Lovenox    DISPOSITION:SDU  full code   d/w Dr. Aquino

## 2018-11-14 NOTE — PROGRESS NOTE ADULT - SUBJECTIVE AND OBJECTIVE BOX
=================================  NEUROCRITICAL CARE ATTENDING NOTE  =================================    DARRYN HOROWITZ   [id number]  Summary:  58y/M with recent Bell's (L sided), on imaging noted incidental sellar mass.  Admitted for elective TSP resection .    Overnight Events: In PACU overnight.    Past Medical History: Pituitary macroadenoma HLD (hyperlipidemia) HTN (hypertension) CAD (coronary artery disease) of artery bypass graft  Allergies:  No Known Allergies  Home meds:  HCTZ 12.5 daily lipitor 40mg HS ramipril 10mg PO daily toprol XL 100mg daily     PHYSICAL EXAMINATION  T(C): 35.7 ( @ 05:00), Max: 35.9 ( @ 13:04) HR: 83 ( @ 08:15) (63 - 90) BP: 151/90 ( @ 08:15) (134/97 - 163/93)RR: 16 ( @ 08:15) (4 - 18) SpO2: 97% ( @ 08:15) (95% - 100%)  NEUROLOGIC EXAMINATION:  Patient is awake, alert, fully oriented, pupils 2-3mm equal and briskly reactive to light, EOMs intact, muscle strength 5/5 on all 4 extremities  GENERAL:  not intubated, not in cardiorespiratory distress  EENT: anicteric  CARDIOVASC:  (+) S1 S2, normal rate and regular rhythm  PULMONARY:  clear to auscultation bilaterally  ABDOMEN:  soft, nontender, with normoactive bowel sounds  EXTREMITIES:  no edema  SKIN:  no rash    LABS:  CAPILLARY BLOOD GLUCOSE 175 152               12.9   8.1   )-----------( 162      ( 2018 06:08 )             37.6     135  |  97  |  15  ----------------------------<  169<H>  3.8   |  21<L>  |  0.73    Ca    8.9      2018 06:08  Phos  3.3     11-14  Mg     1.7     11-14    TPro  7.0  /  Alb  4.3  /  TBili  0.6  /  DBili  x   /  AST  24  /  ALT  27  /  AlkPhos  62   @ 07: @ 07:00  IN: 950 mL / OUT: 1125 mL / NET: -175 mL    Bacteriology:  CSF studies:  EEG:  Neuroimagin/13 MRI:  selalr/suprasellar mass compression of optic chiasm  10/09 CT maxillofacial:  navigational exam; sella turcica enlargement, piuitary mass  Other imaging:    MEDICATIONS: alprazolam HS atorvastatin 40 HS cefazolin 1g IV q8h docusate 100 BID hydrocortisone 100 q8h mod IS lisinopril 5mg PO daily metoprolol 100mg PO daily pantorpazole 40 daily senna 2mg HS fentnayl PRN ketorolac PRN     IV FLUIDS:  DRIPS:  DIET:  Lines:  Drains:    18 @ 07:01  -  18 @ 07:00  --------------------------------------------------------  OUT:    Indwelling Catheter - Urethral: 520 mL    Voided: 605 mL  Total OUT: 1125 mL        Wounds:    CODE STATUS:  Full Code                       GOALS OF CARE:  aggressive                      DISPOSITION:  ICU =================================  NEUROCRITICAL CARE ATTENDING NOTE  =================================    DARRYN HOROWITZ   [id number]  Summary:  58y/M with recent Bell's (L sided), on imaging noted incidental sellar mass.  Admitted for elective TSP resection .    Overnight Events: In PACU overnight.  REVIEW OF SYSTEMS:  No headaches, no nausea or vomiting; 14 -point review of systems otherwise unremarkable.    Past Medical History: Pituitary macroadenoma HLD (hyperlipidemia) HTN (hypertension) CAD (coronary artery disease) of artery bypass graft  Allergies:  No Known Allergies  Home meds:  HCTZ 12.5 daily lipitor 40mg HS ramipril 10mg PO daily toprol XL 100mg daily     PHYSICAL EXAMINATION  T(C): 35.7 ( @ 05:00), Max: 35.9 ( @ 13:04) HR: 83 ( @ 08:15) (63 - 90) BP: 151/90 ( @ 08:15) (134/97 - 163/93)RR: 16 ( @ 08:15) (4 - 18) SpO2: 97% ( @ 08:15) (95% - 100%)  NEUROLOGIC EXAMINATION:  Patient is awake, alert, fully oriented, pupils 2-3mm equal and briskly reactive to light, EOMs intact, muscle strength 5/5 on all 4 extremities  GENERAL:  not intubated, not in cardiorespiratory distress  EENT: anicteric  CARDIOVASC:  (+) S1 S2, normal rate and regular rhythm  PULMONARY:  clear to auscultation bilaterally  ABDOMEN:  soft, nontender, with normoactive bowel sounds  EXTREMITIES:  no edema  SKIN:  no rash    LABS:  CAPILLARY BLOOD GLUCOSE 175 152               12.9   8.1   )-----------( 162      ( 2018 06:08 )             37.6     135  |  97  |  15  ----------------------------<  169<H>  3.8   |  21<L>  |  0.73    Ca    8.9      2018 06:08  Phos  3.3     -  Mg     1.7         TPro  7.0  /  Alb  4.3  /  TBili  0.6  /  DBili  x   /  AST  24  /  ALT  27  /  AlkPhos  62   @ 07:01  -   @ 07:00  IN: 950 mL / OUT: 1125 mL / NET: -175 mL    Bacteriology:  CSF studies:  EEG:  Neuroimagin/13 MRI:  selalr/suprasellar mass compression of optic chiasm  10/09 CT maxillofacial:  navigational exam; sella turcica enlargement, piuitary mass  Other imaging:    MEDICATIONS: alprazolam HS atorvastatin 40 HS cefazolin 1g IV q8h docusate 100 BID hydrocortisone 100 q8h mod IS lisinopril 5mg PO daily metoprolol 100mg PO daily pantoprazole 40 daily senna 2mg HS fentnayl PRN ketorolac PRN     IV FLUIDS: NS@50cc/hr  DRIPS:  DIET: regular diet  Lines:  Drains:    Wounds:    CODE STATUS:  Full Code                       GOALS OF CARE:  aggressive                      DISPOSITION:  8la =================================  NEUROCRITICAL CARE ATTENDING NOTE  =================================    DARRYN HOROWITZ   MRN-[id number]  Summary:  58y/M with recent Bell's (L sided), on imaging noted incidental sellar mass.  Admitted for elective TSP resection .    Overnight Events: In PACU overnight, complained of headache overnight, responded to fioricet  REVIEW OF SYSTEMS:  No headaches, no nausea or vomiting; 14 -point review of systems otherwise unremarkable.    Past Medical History: Pituitary macroadenoma HLD (hyperlipidemia) HTN (hypertension) CAD (coronary artery disease) of artery bypass graft  Allergies:  No Known Allergies  Home meds:  HCTZ 12.5 daily lipitor 40mg HS ramipril 10mg PO daily toprol XL 100mg daily     PHYSICAL EXAMINATION  T(C): 35.7 ( @ 05:00), Max: 35.9 ( @ 13:04) HR: 83 ( @ 08:15) (63 - 90) BP: 151/90 ( @ 08:15) (134/97 - 163/93)RR: 16 ( @ 08:15) (4 - 18) SpO2: 97% ( @ 08:15) (95% - 100%)  NEUROLOGIC EXAMINATION:  Patient is awake, alert, fully oriented, pupils 2-3mm equal and briskly reactive to light, EOMs intact, muscle strength 5/5 on all 4 extremities, no visual field deficits  GENERAL:  not intubated, not in cardiorespiratory distress  EENT: anicteric  CARDIOVASC:  (+) S1 S2, normal rate and regular rhythm  PULMONARY:  clear to auscultation bilaterally  ABDOMEN:  soft, nontender, with normoactive bowel sounds  EXTREMITIES:  no edema  SKIN:  no rash    LABS:  CAPILLARY BLOOD GLUCOSE 175 152               12.9   8.1   )-----------( 162      ( 2018 06:08 )             37.6     135  |  97  |  15  ----------------------------<  169<H>  3.8   |  21<L>  |  0.73    Ca    8.9      2018 06:08  Phos  3.3     -  Mg     1.7         TPro  7.0  /  Alb  4.3  /  TBili  0.6  /  DBili  x   /  AST  24  /  ALT  27  /  AlkPhos  62   @ 07:01  -   @ 07:00  IN: 950 mL / OUT: 1125 mL / NET: -175 mL    Bacteriology:  CSF studies:  EEG:  Neuroimagin/13 MRI:  selalr/suprasellar mass compression of optic chiasm  10/09 CT maxillofacial:  navigational exam; sella turcica enlargement, pituitary mass  Other imaging:    MEDICATIONS: alprazolam HS atorvastatin 40 HS cefazolin 1g IV q8h docusate 100 BID hydrocortisone 100 q8h mod IS lisinopril 5mg PO daily metoprolol 100mg PO daily pantoprazole 40 daily senna 2mg HS fentanyl PRN ketorolac PRN     IV FLUIDS: NS@50cc/hr  DRIPS:  DIET: regular diet  Lines:  Drains:    Wounds:    CODE STATUS:  Full Code                       GOALS OF CARE:  aggressive                      DISPOSITION:  8la

## 2018-11-14 NOTE — PHYSICAL THERAPY INITIAL EVALUATION ADULT - DID THE PATIENT HAVE SURGERY?
Transphenoidal or transnasal resection of pituitary tumor with magnetic resonance imaging guidance/yes

## 2018-11-15 ENCOUNTER — TRANSCRIPTION ENCOUNTER (OUTPATIENT)
Age: 58
End: 2018-11-15

## 2018-11-15 VITALS — TEMPERATURE: 100 F

## 2018-11-15 LAB
ANION GAP SERPL CALC-SCNC: 13 MMOL/L — SIGNIFICANT CHANGE UP (ref 5–17)
BUN SERPL-MCNC: 19 MG/DL — SIGNIFICANT CHANGE UP (ref 7–23)
CALCIUM SERPL-MCNC: 9.1 MG/DL — SIGNIFICANT CHANGE UP (ref 8.4–10.5)
CHLORIDE SERPL-SCNC: 101 MMOL/L — SIGNIFICANT CHANGE UP (ref 96–108)
CO2 SERPL-SCNC: 22 MMOL/L — SIGNIFICANT CHANGE UP (ref 22–31)
CREAT SERPL-MCNC: 0.93 MG/DL — SIGNIFICANT CHANGE UP (ref 0.5–1.3)
GLUCOSE BLDC GLUCOMTR-MCNC: 132 MG/DL — HIGH (ref 70–99)
GLUCOSE BLDC GLUCOMTR-MCNC: 155 MG/DL — HIGH (ref 70–99)
GLUCOSE BLDC GLUCOMTR-MCNC: 228 MG/DL — HIGH (ref 70–99)
GLUCOSE SERPL-MCNC: 127 MG/DL — HIGH (ref 70–99)
HCT VFR BLD CALC: 35 % — LOW (ref 39–50)
HGB BLD-MCNC: 11.7 G/DL — LOW (ref 13–17)
MAGNESIUM SERPL-MCNC: 2.1 MG/DL — SIGNIFICANT CHANGE UP (ref 1.6–2.6)
MCHC RBC-ENTMCNC: 29.3 PG — SIGNIFICANT CHANGE UP (ref 27–34)
MCHC RBC-ENTMCNC: 33.4 G/DL — SIGNIFICANT CHANGE UP (ref 32–36)
MCV RBC AUTO: 87.5 FL — SIGNIFICANT CHANGE UP (ref 80–100)
PHOSPHATE SERPL-MCNC: 2.7 MG/DL — SIGNIFICANT CHANGE UP (ref 2.5–4.5)
PLATELET # BLD AUTO: 186 K/UL — SIGNIFICANT CHANGE UP (ref 150–400)
POTASSIUM SERPL-MCNC: 4.1 MMOL/L — SIGNIFICANT CHANGE UP (ref 3.5–5.3)
POTASSIUM SERPL-SCNC: 4.1 MMOL/L — SIGNIFICANT CHANGE UP (ref 3.5–5.3)
RBC # BLD: 4 M/UL — LOW (ref 4.2–5.8)
RBC # FLD: 12.2 % — SIGNIFICANT CHANGE UP (ref 10.3–16.9)
SODIUM SERPL-SCNC: 136 MMOL/L — SIGNIFICANT CHANGE UP (ref 135–145)
WBC # BLD: 15.2 K/UL — HIGH (ref 3.8–10.5)
WBC # FLD AUTO: 15.2 K/UL — HIGH (ref 3.8–10.5)

## 2018-11-15 PROCEDURE — 99231 SBSQ HOSP IP/OBS SF/LOW 25: CPT | Mod: 24

## 2018-11-15 RX ORDER — SENNA PLUS 8.6 MG/1
2 TABLET ORAL
Qty: 0 | Refills: 0 | DISCHARGE
Start: 2018-11-15

## 2018-11-15 RX ORDER — PANTOPRAZOLE SODIUM 20 MG/1
1 TABLET, DELAYED RELEASE ORAL
Qty: 30 | Refills: 0
Start: 2018-11-15 | End: 2018-12-14

## 2018-11-15 RX ORDER — HYDROCORTISONE 20 MG
20 TABLET ORAL DAILY
Refills: 0 | Status: DISCONTINUED | OUTPATIENT
Start: 2018-11-15 | End: 2018-11-15

## 2018-11-15 RX ORDER — ACETAMINOPHEN 500 MG
2 TABLET ORAL
Qty: 0 | Refills: 0 | DISCHARGE
Start: 2018-11-15

## 2018-11-15 RX ORDER — DOCUSATE SODIUM 100 MG
1 CAPSULE ORAL
Qty: 0 | Refills: 0 | DISCHARGE
Start: 2018-11-15

## 2018-11-15 RX ORDER — HYDROCORTISONE 20 MG
10 TABLET ORAL AT BEDTIME
Refills: 0 | Status: DISCONTINUED | OUTPATIENT
Start: 2018-11-16 | End: 2018-11-15

## 2018-11-15 RX ORDER — HYDROCORTISONE 20 MG
25 TABLET ORAL EVERY 8 HOURS
Refills: 0 | Status: DISCONTINUED | OUTPATIENT
Start: 2018-11-15 | End: 2018-11-15

## 2018-11-15 RX ORDER — IBUPROFEN 200 MG
600 TABLET ORAL EVERY 8 HOURS
Refills: 0 | Status: DISCONTINUED | OUTPATIENT
Start: 2018-11-15 | End: 2018-11-15

## 2018-11-15 RX ORDER — HYDROCORTISONE 20 MG
1 TABLET ORAL
Qty: 30 | Refills: 0
Start: 2018-11-15 | End: 2018-12-14

## 2018-11-15 RX ORDER — SODIUM,POTASSIUM PHOSPHATES 278-250MG
1 POWDER IN PACKET (EA) ORAL
Refills: 0 | Status: DISCONTINUED | OUTPATIENT
Start: 2018-11-15 | End: 2018-11-15

## 2018-11-15 RX ADMIN — Medication 25 MILLIGRAM(S): at 13:27

## 2018-11-15 RX ADMIN — Medication 1 PACKET(S): at 12:03

## 2018-11-15 RX ADMIN — Medication 4: at 12:34

## 2018-11-15 RX ADMIN — Medication 15 MILLIGRAM(S): at 05:35

## 2018-11-15 RX ADMIN — Medication 15 MILLIGRAM(S): at 06:00

## 2018-11-15 RX ADMIN — Medication 15 MILLIGRAM(S): at 10:46

## 2018-11-15 RX ADMIN — Medication 100 MILLIGRAM(S): at 05:34

## 2018-11-15 RX ADMIN — Medication 50 MILLIGRAM(S): at 05:35

## 2018-11-15 RX ADMIN — Medication 100 MILLIGRAM(S): at 05:36

## 2018-11-15 RX ADMIN — LISINOPRIL 10 MILLIGRAM(S): 2.5 TABLET ORAL at 05:34

## 2018-11-15 RX ADMIN — PANTOPRAZOLE SODIUM 40 MILLIGRAM(S): 20 TABLET, DELAYED RELEASE ORAL at 07:01

## 2018-11-15 RX ADMIN — Medication 100 MILLIGRAM(S): at 05:35

## 2018-11-15 RX ADMIN — Medication 100 MILLIGRAM(S): at 13:27

## 2018-11-15 NOTE — DISCHARGE NOTE ADULT - CARE PROVIDER_API CALL
Denny Aquino), Neurological Surgery  [address] [address]  Phone: [phone]  Fax: [phone]    Terry Wiley), Otolaryngology  [address]  [address] [place] [address]  Phone: [phone]  Fax: [phone]

## 2018-11-15 NOTE — PROGRESS NOTE ADULT - SUBJECTIVE AND OBJECTIVE BOX
Note from 11/14 (20:00)-- did not save correctly  in SCM yesterday      58 year old male, had recent episode of L facial palsy / Bell's Palsy, work-up found an incidental sellar mass, hormonal work-up negative. Had pre-op visual fields.    Now s/p transsphenoidal approach for micropituitary adenoma on 11/13. Doing well post op but remains in PACU awaiting ICU bed. Other than headache, denies nausea/vomiting, metallic or salty taste, changes in vision, nausea or vomiting. Labs have been within normal limits and UOP has been appropriate.    Small amount of bloody discharge from right nare noted on PM rounds but had resolved.    PE:  VSS  No acute distress  PERRL, EOMI  No clear rhinorrhea, small amount of bloody discharge from right nare, R merocel in place  CN 2-12 intact    UOP overnight on 11/13-11/14: about 40 cc/hour      A/P: 58y Male s/p transnasal tsp resection of pituitary tumor POD # 1  -Monitor for CSF leak or epistaxis  -Monitor UOP and sodium levels  -Continue antibiotics while packing in place  -- Pituitary tsp precaution:  no straws, no incentive spirometer, no sneezing with mouth closed, no straining with BM  -Management of other issues per NSGY and ICU team  -Please page ENT with questions or concerns  D/w attending

## 2018-11-15 NOTE — PROGRESS NOTE ADULT - SUBJECTIVE AND OBJECTIVE BOX
58 year old male, had recent episode of L facial palsy / Bell's Palsy, work-up found an incidental sellar mass, hormonal work-up negative. Had pre-op visual fields.    Now s/p transsphenoidal approach for micropituitary adenoma on 11/13. Doing well post op but remains in PACU awaiting ICU bed. Other than headache, denies nausea/vomiting, metallic or salty taste, changes in vision, nausea or vomiting. Labs have been within normal limits and UOP has been appropriate.    Small amount of bloody discharge from right nare noted on PM rounds but had resolved.    Interval:  11/15- Right merocel removed at bedside this AM, small amount of bloody discharge from right nare which resolved after a couple of hours. NSGY planning on discharging patient as he has had appropriate UOP, headaches resolved, no vision changes, no salty or metallic taste.    PE:  VSS  No acute distress  PERRL, EOMI  No clear rhinorrhea, right merocel removed, small amount of bloody oozing, now resolved  CN 2-12 intact    UOP overnight: 600 cc overnight  Labs reviewed: Na 136, Wbc 15.2      A/P: 58y Male s/p transnasal tsp resection of pituitary tumor POD # 1  -Monitor for CSF leak or epistaxis  -- Pituitary tsp precaution:  no straws, no incentive spirometer, no sneezing with mouth closed, no straining with BM  -Discharge per neurosurgery  -Please call Dr. Wiley office to schedule follow up  -Please page ENT with questions or concerns  D/w attending

## 2018-11-15 NOTE — DISCHARGE NOTE ADULT - PLAN OF CARE
return home and regain your independent activity Activity as tolerated   No heavy lifting anything greater than 10pounds no bending or lifting  Do not use straws, blow your nose, strain or sneeze  Any clear nasal draiange , temperature greater than 101.5 degrees F call the office  Once home make an appt with Dr Aquino and Dr Wiley Activity as tolerated   No heavy lifting anything greater than 10pounds no bending or lifting  Do not use straws, blow your nose, strain or sneeze  Any clear nasal drainage , temperature greater than 101.5 degrees F call the office  Once home make an appt with Dr Aquino and Dr Wiley Activity as tolerated   No heavy lifting anything greater than 10pounds no bending or lifting  Do not use straws, blow your nose, strain or sneeze  Any clear nasal drainage , temperature greater than 101.5 degrees F call the office  Once home make an appt with Dr Aquino and Dr Wiley  Resume aspirin in one week after discharge  Once you make a follow up appt with Dr Aquino ask him about hydrocortisone taper   Take antacid for hydrocortisone

## 2018-11-15 NOTE — OCCUPATIONAL THERAPY INITIAL EVALUATION ADULT - PERTINENT HX OF CURRENT PROBLEM, REHAB EVAL
58 year old male, had recent episode of L facial palsy / Bell's Palsy, work-up found an incidental sellar mass, hormonal work-up negative. Transphenoidal or transnasal resection of pituitary tumor with magnetic resonance imaging guidance.

## 2018-11-15 NOTE — DISCHARGE NOTE ADULT - MEDICATION SUMMARY - MEDICATIONS TO TAKE
I will START or STAY ON the medications listed below when I get home from the hospital:    hydrocortisone 10 mg oral tablet  -- 1 tab(s) by mouth once a day (at bedtime)  -- Indication: For Steroid    hydrocortisone 20 mg oral tablet  -- 1 tab(s) by mouth once a day  -- Indication: For Steroid    acetaminophen 325 mg oral tablet  -- 2 tab(s) by mouth every 6 hours, As needed, Temp greater or equal to 38C (100.4F), Mild Pain (1 - 3)  -- Indication: For mild pain    butalbital/acetaminophen/caffeine 50 mg-300 mg-40 mg oral capsule  -- 1 cap(s) by mouth every 6 hours, As needed, Headache MDD:4  -- Indication: For migrane headache    ramipril 10 mg oral capsule  -- 1 cap(s) by mouth once a day  -- Indication: For blood pressure    Lipitor 40 mg oral tablet  -- 1 tab(s) by mouth once a day (at bedtime)  -- Indication: For Cholesterol    Toprol- mg oral tablet, extended release  -- 1 tab(s) by mouth once a day  -- Indication: For blood pressure    hydroCHLOROthiazide 12.5 mg oral tablet  -- 1 tab(s) by mouth once a day  -- Indication: For water pill    docusate sodium 100 mg oral capsule  -- 1 cap(s) by mouth 2 times a day  -- Indication: For Stool softner    senna oral tablet  -- 2 tab(s) by mouth once a day (at bedtime)  -- Indication: For fiber pill    pantoprazole 40 mg oral delayed release tablet  -- 1 tab(s) by mouth once a day (before a meal)  -- Indication: For antacid

## 2018-11-15 NOTE — DISCHARGE NOTE ADULT - HOSPITAL COURSE
History of Present Illness:  History of Present Illness		  58 year old male, had recent episode of L facial palsy / Bell's Palsy, work-up found an incidental sellar mass, hormonal work-up negative. Had pre-op visual fields.     Pt underwent on Transphenoidal or transnasal resection of pituitary tumor with magnetic resonance imaging guidance  11/13/2018      Packinf removed packing 11/15/2018  Pt discharge History of Present Illness:  History of Present Illness		  58 year old male, had recent episode of L facial palsy / Bell's Palsy, work-up found an incidental sellar mass, hormonal work-up negative. Had pre-op visual fields.     Pt underwent on Transphenoidal or transnasal resection of pituitary tumor with magnetic resonance imaging guidance  11/13/2018      Packing removed packing 11/15/2018  Pt discharge

## 2018-11-15 NOTE — DISCHARGE NOTE ADULT - PATIENT PORTAL LINK FT
You can access the [facility] Patient Portal, offered by [facility], by registering with the following website: [email or web address]

## 2018-11-15 NOTE — DISCHARGE NOTE ADULT - CARE PLAN
Principal Discharge DX:	Pituitary macroadenoma  Goal:	return home and regain your independent activity  Assessment and plan of treatment:	Activity as tolerated   No heavy lifting anything greater than 10pounds no bending or lifting  Do not use straws, blow your nose, strain or sneeze  Any clear nasal draiange , temperature greater than 101.5 degrees F call the office  Once home make an appt with Dr Aquino and Dr Wiley Principal Discharge DX:	Pituitary macroadenoma  Goal:	return home and regain your independent activity  Assessment and plan of treatment:	Activity as tolerated   No heavy lifting anything greater than 10pounds no bending or lifting  Do not use straws, blow your nose, strain or sneeze  Any clear nasal drainage , temperature greater than 101.5 degrees F call the office  Once home make an appt with Dr Aquino and Dr Wiley Principal Discharge DX:	Pituitary macroadenoma  Goal:	return home and regain your independent activity  Assessment and plan of treatment:	Activity as tolerated   No heavy lifting anything greater than 10pounds no bending or lifting  Do not use straws, blow your nose, strain or sneeze  Any clear nasal drainage , temperature greater than 101.5 degrees F call the office  Once home make an appt with Dr Aquino and Dr Wiley  Resume aspirin in one week after discharge  Once you make a follow up appt with Dr Aquino ask him about hydrocortisone taper   Take antacid for hydrocortisone

## 2018-11-15 NOTE — CONSULT NOTE ADULT - SUBJECTIVE AND OBJECTIVE BOX
Patient is a 58y old  Male who presents with a chief complaint of Pituitary Tumor (15 Nov 2018 10:03)        HPI:  58 year old male, had recent episode of L facial palsy / Bell's Palsy, work-up found an incidental sellar mass, hormonal work-up negative. Had pre-op visual fields. (13 Nov 2018 15:38)     No headaches weakness numbness or tingling      Allergies  No Known Allergies      Health Issues  D35.2  Handoff  MEWS Score  Pituitary macroadenoma  HLD (hyperlipidemia)  HTN (hypertension)  CAD (coronary artery disease) of artery bypass graft  Pituitary adenoma  Pituitary adenoma  Pituitary macroadenoma  Pituitary macroadenoma  Transphenoidal or transnasal resection of pituitary tumor with magnetic resonance imaging guidance  S/P CABG x 4  History of tonsillectomy  S/P CABG x 3        FAMILY HISTORY:      MEDICATIONS  (STANDING):  ALPRAZolam 0.5 milliGRAM(s) Oral at bedtime  atorvastatin 40 milliGRAM(s) Oral at bedtime  ceFAZolin   IVPB      ceFAZolin   IVPB 1000 milliGRAM(s) IV Intermittent every 8 hours  dextrose 5%. 1000 milliLiter(s) (50 mL/Hr) IV Continuous <Continuous>  dextrose 50% Injectable 12.5 Gram(s) IV Push once  dextrose 50% Injectable 25 Gram(s) IV Push once  dextrose 50% Injectable 25 Gram(s) IV Push once  docusate sodium 100 milliGRAM(s) Oral two times a day  hydrocortisone 20 milliGRAM(s) Oral daily  influenza   Vaccine 0.5 milliLiter(s) IntraMuscular once  insulin lispro (HumaLOG) corrective regimen sliding scale   SubCutaneous every 6 hours  lisinopril 10 milliGRAM(s) Oral daily  metoprolol succinate  milliGRAM(s) Oral daily  pantoprazole    Tablet 40 milliGRAM(s) Oral before breakfast  potassium phosphate / sodium phosphate powder 1 Packet(s) Oral three times a day with meals  senna 2 Tablet(s) Oral at bedtime    MEDICATIONS  (PRN):  acetaminophen   Tablet .. 650 milliGRAM(s) Oral every 6 hours PRN Temp greater or equal to 38C (100.4F), Mild Pain (1 - 3)  acetaminophen 300 mG/butalbital 50 mG/ caffeine 40 mG 1 Capsule(s) Oral every 6 hours PRN Headache  dextrose 40% Gel 15 Gram(s) Oral once PRN Blood Glucose LESS THAN 70 milliGRAM(s)/deciliter  glucagon  Injectable 1 milliGRAM(s) IntraMuscular once PRN Glucose LESS THAN 70 milligrams/deciliter  ketorolac   Injectable 15 milliGRAM(s) IV Push every 4 hours PRN Moderate Pain (4 - 6)      PAST MEDICAL & SURGICAL HISTORY:  Pituitary macroadenoma  HLD (hyperlipidemia)  HTN (hypertension)  CAD (coronary artery disease) of artery bypass graft  S/P CABG x 4  History of tonsillectomy      Labs                          11.7   15.2  )-----------( 186      ( 15 Nov 2018 07:29 )             35.0     11-15    136  |  101  |  19  ----------------------------<  127<H>  4.1   |  22  |  0.93    Ca    9.1      15 Nov 2018 07:29  Phos  2.7     11-15  Mg     2.1     11-15    TPro  7.0  /  Alb  4.3  /  TBili  0.6  /  DBili  x   /  AST  24  /  ALT  27  /  AlkPhos  62  11-13      Radiology:    Physical Exam    MENTAL STATUS  -Level of Consciousness- awake    Orientation- person, place time  Language- aphasia/ dysarthria- nl  Memory- recent and remote- nl      Cranial Nerve 1- 12  Pupils- equal and reactive  Eye movements- full  Facial - no asymmetry   Lower CN-nl    Gait and Station- n/a    MOTOR  Upper-nl  Lower-nl    Reflexes-nl    Sensation- nl    Cerebellar- no tremors    vascular - no bruits    Assessment- Pituitary tumor    Plan  as per Dr Aquino

## 2018-11-15 NOTE — DISCHARGE NOTE ADULT - CARE PROVIDERS DIRECT ADDRESSES
[email or web address]

## 2018-11-15 NOTE — PROGRESS NOTE ADULT - SUBJECTIVE AND OBJECTIVE BOX
=================================  NEUROCRITICAL CARE ATTENDING NOTE  =================================    DARRYN HOROWITZ   MRN-[id number]  Summary:  58y/M with recent Bell's (L sided), on imaging noted incidental sellar mass.  Admitted for elective TSP resection .    Overnight Events: stepped down yesterday   REVIEW OF SYSTEMS:  No headaches, no nausea or vomiting; 14 -point review of systems otherwise unremarkable.    Past Medical History: Pituitary macroadenoma HLD (hyperlipidemia) HTN (hypertension) CAD (coronary artery disease) of artery bypass graft  Allergies:  No Known Allergies  Home meds:  HCTZ 12.5 daily lipitor 40mg HS ramipril 10mg PO daily toprol XL 100mg daily     PHYSICAL EXAMINATION  T(C): 36.3 (11-15 @ 04:51), Max: 37.1 ( @ 22:22) HR: 74 (11-15 @ 05:36) (74 - 86) BP: 123/79 (11-15 @ 05:36) (123/79 - 168/99) RR: 18 (11-15 @ 05:36) (13 - 20) SpO2: 96% (11-15 @ 05:36) (95% - 98%)   NEUROLOGIC EXAMINATION:  Patient is awake, alert, fully oriented, pupils 2-3mm equal and briskly reactive to light, EOMs intact, muscle strength 5/5 on all 4 extremities, no visual field deficits  GENERAL:  not intubated, not in cardiorespiratory distress  EENT: anicteric  CARDIOVASC:  (+) S1 S2, normal rate and regular rhythm  PULMONARY:  clear to auscultation bilaterally  ABDOMEN:  soft, nontender, with normoactive bowel sounds  EXTREMITIES:  no edema  SKIN:  no rash    LABS:  CAPILLARY BLOOD GLUCOSE 132 155 150                11.7   15.2  )-----------( 186      ( 15 Nov 2018 07:29 )             35.0     136  |  101  |  19  ----------------------------<  127<H>  4.1   |  22  |  0.93    Ca    9.1      15 Nov 2018 07:29  Phos  2.7     11-15  Mg     2.1     -15    TPro  7.0  /  Alb  4.3  /  TBili  0.6  /  DBili  x   /  AST  24  /  ALT  27  /  AlkPhos  62   @ 07:01  -  11-15 @ 07:00  IN: 625 mL / OUT: 690 mL / NET: -65 mL    Bacteriology:  CSF studies:  EEG:  Neuroimagin/13 MRI:  selalr/suprasellar mass compression of optic chiasm  10/09 CT maxillofacial:  navigational exam; sella turcica enlargement, pituitary mass  Other imaging:    MEDICATIONS: alprazolam HS atorvastatin 40 HS cefazolin 1g IV q8h docusate 100 BID hydrocortisone 100 q8h mod IS lisinopril 5mg PO daily metoprolol 100mg PO daily pantoprazole 40 daily senna 2mg HS fentanyl PRN ketorolac PRN   MEDICATIONS: alprazolam 0.5 mg HS atorvastatin 40mg HS bisacodyl 5mg q12h PRN cefazolin 1g IV q8h docusate 100 BID hydrocortisone 50 q8h mod ISS ketorolac PRN lisinopril 10mg daily metoprolol 100mg ER dailiy pantorpazole 40 daily senna 2mg HS     IV FLUIDS: NS@50cc/hr  DRIPS:  DIET: regular diet  Lines:  Drains:    Wounds:    CODE STATUS:  Full Code                       GOALS OF CARE:  aggressive                      DISPOSITION:  8la

## 2018-11-15 NOTE — DISCHARGE NOTE ADULT - NS AS ACTIVITY OBS
No Heavy lifting/straining/Do not make important decisions/Do not drive or operate machinery/Showering allowed/Walking-Indoors allowed/Walking-Outdoors allowed/Stairs allowed

## 2018-11-15 NOTE — OCCUPATIONAL THERAPY INITIAL EVALUATION ADULT - MANUAL MUSCLE TESTING RESULTS, REHAB EVAL
Bilateral upper extremities 5/5 throughout Bilateral upper extremities 5/5 throughout. All facial movements symmetrical.

## 2018-11-15 NOTE — PROGRESS NOTE ADULT - ASSESSMENT
58y/M with  1.  Pituitary macroadenoma s/p transphenoidal resection (11/13/2018, Dr. Aquino, Dr. Wiley)  2.  Hypertension dyslipidemia   3.  CAD, s/p CABG    PLAN:   NEURO: neurochecks q4h, PRN pain meds with tylenol, DI watch  s/p resection of pituitary mass:  f/u final histopathology; MRI on stepdown, hydrocortisone taper as per NS  REHAB:  physical therapy evaluation and management    EARLY MOB:  OOB to chair    PULM:  PRN O2 support to keep sats >/=92%, skull base precautions: no nose blowing, drinking with a straw, or bending below waist; NO incentive spirometry   CARDIO:  SBP goal 100-150mm Hg, continue lisinopril, metoprolol  ENDO:  Blood sugar goals 140-180 mg/dL, continue insulin sliding scale  GI:  PPI for GI prophylaxis while on steroids  DIET: regular diet  RENAL:  IVL  HEM/ONC: Hb stable  VTE Prophylaxis: SCDs, no DVT chemoprophylaxis for now as patient is high risk for bleed (fresh post-op), baseline LE Doppler for DVT suspected on admission (pit adenoma)  ID: afebrile, no leukocytosis  Social: will update family    ATTENDING ATTESTATION:  I was physically present for the key portions of the evaluation and management (E/M) service provided.  I agree with the above history, physical and plan which I have reviewed and edited where appropriate.     Patient not at high risk for neurologic deterioration / death.  Time spent on this noncritically ill patient: 45 minutes spent on total encounter, more than 50% of the visit was spent counseling and/or coordinating care by the attending physician.    Plan discussed with RN, house staff. 58y/M with  1.  Pituitary macroadenoma s/p transphenoidal resection (11/13/2018, Dr. Aquino, Dr. Wiley)  2.  Hypertension dyslipidemia   3.  CAD, s/p CABG    PLAN:   NEURO: neurochecks q4h, PRN pain meds with tylenol, DI watch  s/p resection of pituitary mass:  f/u final histopathology; MRI on stepdown, hydrocortisone taper as per NS  REHAB:  physical therapy evaluation and management    EARLY MOB:  OOB to chair  DISPO planning    PULM:  room air, skull base precautions: no nose blowing, drinking with a straw, or bending below waist; NO incentive spirometry   CARDIO:  SBP goal 100-150mm Hg, continue lisinopril, metoprolol  ENDO:  Blood sugar goals 140-180 mg/dL, continue insulin sliding scale  GI:  PPI for GI prophylaxis while on steroids  DIET: regular diet  RENAL:  IVL  HEM/ONC: Hb stable  VTE Prophylaxis: SCDs, no DVT chemoprophylaxis (discharge)  ID: afebrile, no leukocytosis  Social: will update family    ATTENDING ATTESTATION:  I was physically present for the key portions of the evaluation and management (E/M) service provided.  I agree with the above history, physical and plan which I have reviewed and edited where appropriate.     Patient not at high risk for neurologic deterioration / death.  Time spent on this noncritically ill patient: 45 minutes spent on total encounter, more than 50% of the visit was spent counseling and/or coordinating care by the attending physician.    Plan discussed with RN, house staff.

## 2018-11-15 NOTE — OCCUPATIONAL THERAPY INITIAL EVALUATION ADULT - GENERAL OBSERVATIONS, REHAB EVAL
Patient cleared for OT evaluation by KEVAN Tate. Patient received semi-raygoza, +tele, +IV heplock, NAD, +family present.

## 2018-11-15 NOTE — PROGRESS NOTE ADULT - SUBJECTIVE AND OBJECTIVE BOX
HPI:  58 year old male, had recent episode of L facial palsy / Bell's Palsy, work-up found an incidental sellar mass, hormonal work-up negative. Had pre-op visual fields. (13 Nov 2018 15:38)    OVERNIGHT EVENTS:    Hospital course:  11/13: s/p  11/14: HA improved with IV toradol, didn't tolerate dilaudid and refused fentanyl; started fioricet prn today for HA       Vital Signs Last 24 Hrs  T(C): 36.3 (15 Nov 2018 04:51), Max: 37.1 (14 Nov 2018 22:22)  T(F): 97.3 (15 Nov 2018 04:51), Max: 98.8 (14 Nov 2018 22:22)  HR: 74 (15 Nov 2018 05:36) (74 - 90)  BP: 123/79 (15 Nov 2018 05:36) (123/79 - 168/99)  BP(mean): 94 (15 Nov 2018 05:36) (94 - 127)  RR: 18 (15 Nov 2018 05:36) (13 - 20)  SpO2: 96% (15 Nov 2018 05:36) (95% - 98%)    I&O's Summary    14 Nov 2018 07:01  -  15 Nov 2018 07:00  --------------------------------------------------------  IN: 625 mL / OUT: 690 mL / NET: -65 mL        PHYSICAL EXAM:  Gen:  Neurological:  CN II-XII  Motor exam:    Cardiovascular:  Respiratory:  Gastrointestinal:  Genitourinary:  Extremities:  Incision/Wound:    TUBES/LINES:  [] Morrow  [] Lumbar Drain  [] Wound Drains  [] Others      DIET:  [] NPO  [] Mechanical  [] Tube feeds    LABS:                        12.9   8.1   )-----------( 162      ( 14 Nov 2018 06:08 )             37.6     11-14    135  |  97  |  15  ----------------------------<  169<H>  3.8   |  21<L>  |  0.73    Ca    8.9      14 Nov 2018 06:08  Phos  3.3     11-14  Mg     1.7     11-14    TPro  7.0  /  Alb  4.3  /  TBili  0.6  /  DBili  x   /  AST  24  /  ALT  27  /  AlkPhos  62  11-13    PT/INR - ( 13 Nov 2018 18:47 )   PT: 12.5 sec;   INR: 1.10                  CAPILLARY BLOOD GLUCOSE      POCT Blood Glucose.: 132 mg/dL (15 Nov 2018 06:24)  POCT Blood Glucose.: 155 mg/dL (15 Nov 2018 00:13)  POCT Blood Glucose.: 150 mg/dL (14 Nov 2018 18:05)      Drug Levels: [] N/A    CSF Analysis: [] N/A      Allergies    No Known Allergies    Intolerances      MEDICATIONS:  Antibiotics:  ceFAZolin   IVPB      ceFAZolin   IVPB 1000 milliGRAM(s) IV Intermittent every 8 hours    Neuro:  acetaminophen   Tablet .. 650 milliGRAM(s) Oral every 6 hours PRN  acetaminophen 300 mG/butalbital 50 mG/ caffeine 40 mG 1 Capsule(s) Oral every 6 hours PRN  ALPRAZolam 0.5 milliGRAM(s) Oral at bedtime  ketorolac   Injectable 15 milliGRAM(s) IV Push every 4 hours PRN    Anticoagulation:    OTHER:  atorvastatin 40 milliGRAM(s) Oral at bedtime  dextrose 40% Gel 15 Gram(s) Oral once PRN  dextrose 50% Injectable 12.5 Gram(s) IV Push once  dextrose 50% Injectable 25 Gram(s) IV Push once  dextrose 50% Injectable 25 Gram(s) IV Push once  docusate sodium 100 milliGRAM(s) Oral two times a day  glucagon  Injectable 1 milliGRAM(s) IntraMuscular once PRN  hydrocortisone sodium succinate Injectable   IV Push   hydrocortisone sodium succinate Injectable 50 milliGRAM(s) IV Push every 8 hours  hydrocortisone sodium succinate Injectable 25 milliGRAM(s) IV Push every 8 hours  influenza   Vaccine 0.5 milliLiter(s) IntraMuscular once  insulin lispro (HumaLOG) corrective regimen sliding scale   SubCutaneous every 6 hours  lisinopril 10 milliGRAM(s) Oral daily  metoprolol succinate  milliGRAM(s) Oral daily  pantoprazole    Tablet 40 milliGRAM(s) Oral before breakfast  senna 2 Tablet(s) Oral at bedtime    IVF:  dextrose 5%. 1000 milliLiter(s) IV Continuous <Continuous>  sodium chloride 0.9%. 1000 milliLiter(s) IV Continuous <Continuous>    CULTURES:    RADIOLOGY & ADDITIONAL TESTS:      ASSESSMENT:  58y Male s/p transnasal tsp resection of pituitary tumor POD #2     PLAN:  NEURO:    CARDIOVASCULAR:    PULMONARY:    RENAL:    GI:    HEME:    ID:    ENDO:    DVT PROPHYLAXIS: [] Venodynes  [] Heparin/Lovenox  PT/OT/OOB    DISPOSITION: stepdown status, full code, dispo pending    d/w Dr. Aquino HPI:  58 year old male, had recent episode of L facial palsy / Bell's Palsy, work-up found an incidental sellar mass, hormonal work-up negative. Had pre-op visual fields. (13 Nov 2018 15:38)    OVERNIGHT EVENTS: No acute events overnight. Afebrile, neuro stable.     Hospital course:  11/13: s/p transphenoidal for transnasal resection of pituitary tumor  11/14: HA improved with IV toradol, didn't tolerate dilaudid and refused fentanyl; started fioricet prn today for HA   11/15: No acute events overnight. Afebrile, neuro stable.     Vital Signs Last 24 Hrs  T(C): 36.3 (15 Nov 2018 04:51), Max: 37.1 (14 Nov 2018 22:22)  T(F): 97.3 (15 Nov 2018 04:51), Max: 98.8 (14 Nov 2018 22:22)  HR: 74 (15 Nov 2018 05:36) (74 - 90)  BP: 123/79 (15 Nov 2018 05:36) (123/79 - 168/99)  BP(mean): 94 (15 Nov 2018 05:36) (94 - 127)  RR: 18 (15 Nov 2018 05:36) (13 - 20)  SpO2: 96% (15 Nov 2018 05:36) (95% - 98%)    I&O's Summary    14 Nov 2018 07:01  -  15 Nov 2018 07:00  --------------------------------------------------------  IN: 625 mL / OUT: 690 mL / NET: -65 mL        PHYSICAL EXAM:  Gen: laying in hospital bed, NAD  HEENT: right nostril with nasal packing in place   Neurological: AA+Ox3, opens eyes spontaneously, following commands  CN II-XII PERRL, EOMI  Motor exam: MAEx4, 5/5 strength throughout  SILT in UE and LE b/l   Cardiovascular: regular rate and rhythm  Respiratory: clear to auscultation   Gastrointestinal: soft, nontender, nondistended    TUBES/LINES:  [] Morrow  [] Lumbar Drain  [] Wound Drains  [] Others      DIET:  [] NPO  [x] Mechanical  [] Tube feeds    LABS:                        12.9   8.1   )-----------( 162      ( 14 Nov 2018 06:08 )             37.6     11-14    135  |  97  |  15  ----------------------------<  169<H>  3.8   |  21<L>  |  0.73    Ca    8.9      14 Nov 2018 06:08  Phos  3.3     11-14  Mg     1.7     11-14    TPro  7.0  /  Alb  4.3  /  TBili  0.6  /  DBili  x   /  AST  24  /  ALT  27  /  AlkPhos  62  11-13    PT/INR - ( 13 Nov 2018 18:47 )   PT: 12.5 sec;   INR: 1.10                  CAPILLARY BLOOD GLUCOSE      POCT Blood Glucose.: 132 mg/dL (15 Nov 2018 06:24)  POCT Blood Glucose.: 155 mg/dL (15 Nov 2018 00:13)  POCT Blood Glucose.: 150 mg/dL (14 Nov 2018 18:05)      Drug Levels: [] N/A    CSF Analysis: [] N/A      Allergies    No Known Allergies    Intolerances      MEDICATIONS:  Antibiotics:  ceFAZolin   IVPB      ceFAZolin   IVPB 1000 milliGRAM(s) IV Intermittent every 8 hours    Neuro:  acetaminophen   Tablet .. 650 milliGRAM(s) Oral every 6 hours PRN  acetaminophen 300 mG/butalbital 50 mG/ caffeine 40 mG 1 Capsule(s) Oral every 6 hours PRN  ALPRAZolam 0.5 milliGRAM(s) Oral at bedtime  ketorolac   Injectable 15 milliGRAM(s) IV Push every 4 hours PRN    Anticoagulation:    OTHER:  atorvastatin 40 milliGRAM(s) Oral at bedtime  dextrose 40% Gel 15 Gram(s) Oral once PRN  dextrose 50% Injectable 12.5 Gram(s) IV Push once  dextrose 50% Injectable 25 Gram(s) IV Push once  dextrose 50% Injectable 25 Gram(s) IV Push once  docusate sodium 100 milliGRAM(s) Oral two times a day  glucagon  Injectable 1 milliGRAM(s) IntraMuscular once PRN  hydrocortisone sodium succinate Injectable   IV Push   hydrocortisone sodium succinate Injectable 50 milliGRAM(s) IV Push every 8 hours  hydrocortisone sodium succinate Injectable 25 milliGRAM(s) IV Push every 8 hours  influenza   Vaccine 0.5 milliLiter(s) IntraMuscular once  insulin lispro (HumaLOG) corrective regimen sliding scale   SubCutaneous every 6 hours  lisinopril 10 milliGRAM(s) Oral daily  metoprolol succinate  milliGRAM(s) Oral daily  pantoprazole    Tablet 40 milliGRAM(s) Oral before breakfast  senna 2 Tablet(s) Oral at bedtime    IVF:  dextrose 5%. 1000 milliLiter(s) IV Continuous <Continuous>  sodium chloride 0.9%. 1000 milliLiter(s) IV Continuous <Continuous>    CULTURES:    RADIOLOGY & ADDITIONAL TESTS:      ASSESSMENT:  58y Male s/p transnasal tsp resection of pituitary tumor POD #2     PLAN:    - neuro checks  - vitals checks  - pain control with Fioricet, Toradol PRN    - R nostril with merocel packing   - cont Hydrocortisone taper   - cont Lisinopril   - cont Lipitor   - cont Metoprolol   - regular diet  - bowel regimen: colace, senna   - GI ppx: Protonix   - Ancef while nasal packing in place  - DVT PROPHYLAXIS: [x] Venodynes  [] Heparin/Lovenox  - PT/OT/OOB    DISPOSITION: stepdown status, full code, home with no needs when ready for discharge     d/w Dr. Aquino

## 2018-11-16 LAB — SURGICAL PATHOLOGY STUDY: SIGNIFICANT CHANGE UP

## 2018-11-19 ENCOUNTER — TRANSCRIPTION ENCOUNTER (OUTPATIENT)
Age: 58
End: 2018-11-19

## 2018-11-19 ENCOUNTER — APPOINTMENT (OUTPATIENT)
Dept: OTOLARYNGOLOGY | Facility: CLINIC | Age: 58
End: 2018-11-19
Payer: COMMERCIAL

## 2018-11-19 ENCOUNTER — MESSAGE (OUTPATIENT)
Age: 58
End: 2018-11-19

## 2018-11-19 VITALS
OXYGEN SATURATION: 98 % | HEART RATE: 67 BPM | SYSTOLIC BLOOD PRESSURE: 163 MMHG | WEIGHT: 175 LBS | BODY MASS INDEX: 23.7 KG/M2 | DIASTOLIC BLOOD PRESSURE: 115 MMHG | HEIGHT: 72 IN

## 2018-11-19 DIAGNOSIS — Z00.00 ENCOUNTER FOR GENERAL ADULT MEDICAL EXAMINATION W/OUT ABNORMAL FINDINGS: ICD-10-CM

## 2018-11-19 PROCEDURE — C1889: CPT

## 2018-11-19 PROCEDURE — 85610 PROTHROMBIN TIME: CPT

## 2018-11-19 PROCEDURE — 99024 POSTOP FOLLOW-UP VISIT: CPT

## 2018-11-19 PROCEDURE — 85027 COMPLETE CBC AUTOMATED: CPT

## 2018-11-19 PROCEDURE — 88305 TISSUE EXAM BY PATHOLOGIST: CPT

## 2018-11-19 PROCEDURE — 88341 IMHCHEM/IMCYTCHM EA ADD ANTB: CPT

## 2018-11-19 PROCEDURE — 88333 PATH CONSLTJ SURG CYTO XM 1: CPT

## 2018-11-19 PROCEDURE — 31237 NSL/SINS NDSC SURG BX POLYPC: CPT | Mod: 50,58

## 2018-11-19 PROCEDURE — 83735 ASSAY OF MAGNESIUM: CPT

## 2018-11-19 PROCEDURE — 86900 BLOOD TYPING SEROLOGIC ABO: CPT

## 2018-11-19 PROCEDURE — 97161 PT EVAL LOW COMPLEX 20 MIN: CPT

## 2018-11-19 PROCEDURE — 80048 BASIC METABOLIC PNL TOTAL CA: CPT

## 2018-11-19 PROCEDURE — 85025 COMPLETE CBC W/AUTO DIFF WBC: CPT

## 2018-11-19 PROCEDURE — 88331 PATH CONSLTJ SURG 1 BLK 1SPC: CPT

## 2018-11-19 PROCEDURE — 88311 DECALCIFY TISSUE: CPT

## 2018-11-19 PROCEDURE — 70551 MRI BRAIN STEM W/O DYE: CPT

## 2018-11-19 PROCEDURE — 36415 COLL VENOUS BLD VENIPUNCTURE: CPT

## 2018-11-19 PROCEDURE — 88304 TISSUE EXAM BY PATHOLOGIST: CPT

## 2018-11-19 PROCEDURE — 80053 COMPREHEN METABOLIC PANEL: CPT

## 2018-11-19 PROCEDURE — 99213 OFFICE O/P EST LOW 20 MIN: CPT | Mod: 25

## 2018-11-19 PROCEDURE — 82962 GLUCOSE BLOOD TEST: CPT

## 2018-11-19 PROCEDURE — 88360 TUMOR IMMUNOHISTOCHEM/MANUAL: CPT

## 2018-11-19 PROCEDURE — 84100 ASSAY OF PHOSPHORUS: CPT

## 2018-11-19 PROCEDURE — 86850 RBC ANTIBODY SCREEN: CPT

## 2018-11-19 PROCEDURE — 88342 IMHCHEM/IMCYTCHM 1ST ANTB: CPT

## 2018-11-19 PROCEDURE — 86901 BLOOD TYPING SEROLOGIC RH(D): CPT

## 2018-11-19 RX ORDER — KETOROLAC TROMETHAMINE 10 MG/1
10 TABLET, FILM COATED ORAL 3 TIMES DAILY
Qty: 12 | Refills: 0 | Status: COMPLETED | COMMUNITY
Start: 2018-11-19 | End: 2018-11-19

## 2018-11-20 DIAGNOSIS — I10 ESSENTIAL (PRIMARY) HYPERTENSION: ICD-10-CM

## 2018-11-20 DIAGNOSIS — D35.2 BENIGN NEOPLASM OF PITUITARY GLAND: ICD-10-CM

## 2018-11-20 DIAGNOSIS — Z95.1 PRESENCE OF AORTOCORONARY BYPASS GRAFT: ICD-10-CM

## 2018-11-20 DIAGNOSIS — Z28.21 IMMUNIZATION NOT CARRIED OUT BECAUSE OF PATIENT REFUSAL: ICD-10-CM

## 2018-11-20 DIAGNOSIS — E78.5 HYPERLIPIDEMIA, UNSPECIFIED: ICD-10-CM

## 2018-11-20 DIAGNOSIS — I25.10 ATHEROSCLEROTIC HEART DISEASE OF NATIVE CORONARY ARTERY WITHOUT ANGINA PECTORIS: ICD-10-CM

## 2018-11-20 PROBLEM — Z00.00 ENCOUNTER FOR PREVENTIVE HEALTH EXAMINATION: Status: ACTIVE | Noted: 2018-08-03

## 2018-12-04 ENCOUNTER — MEDICATION RENEWAL (OUTPATIENT)
Age: 58
End: 2018-12-04

## 2018-12-04 ENCOUNTER — APPOINTMENT (OUTPATIENT)
Dept: NEUROSURGERY | Facility: CLINIC | Age: 58
End: 2018-12-04
Payer: COMMERCIAL

## 2018-12-04 VITALS
OXYGEN SATURATION: 98 % | RESPIRATION RATE: 18 BRPM | SYSTOLIC BLOOD PRESSURE: 133 MMHG | TEMPERATURE: 97.6 F | WEIGHT: 175 LBS | HEIGHT: 72 IN | HEART RATE: 77 BPM | DIASTOLIC BLOOD PRESSURE: 86 MMHG | BODY MASS INDEX: 23.7 KG/M2

## 2018-12-04 DIAGNOSIS — G89.18 OTHER ACUTE POSTPROCEDURAL PAIN: ICD-10-CM

## 2018-12-04 PROCEDURE — 99024 POSTOP FOLLOW-UP VISIT: CPT

## 2018-12-06 ENCOUNTER — APPOINTMENT (OUTPATIENT)
Dept: NEUROSURGERY | Facility: CLINIC | Age: 58
End: 2018-12-06

## 2018-12-10 ENCOUNTER — APPOINTMENT (OUTPATIENT)
Dept: OTOLARYNGOLOGY | Facility: CLINIC | Age: 58
End: 2018-12-10
Payer: COMMERCIAL

## 2018-12-10 VITALS
HEART RATE: 65 BPM | DIASTOLIC BLOOD PRESSURE: 102 MMHG | SYSTOLIC BLOOD PRESSURE: 138 MMHG | WEIGHT: 175 LBS | HEIGHT: 72 IN | OXYGEN SATURATION: 98 % | BODY MASS INDEX: 23.7 KG/M2

## 2018-12-10 PROCEDURE — 99212 OFFICE O/P EST SF 10 MIN: CPT | Mod: 25

## 2018-12-10 PROCEDURE — 31237 NSL/SINS NDSC SURG BX POLYPC: CPT | Mod: 50,79

## 2018-12-10 RX ORDER — TRIAMCINOLONE ACETONIDE 55 UG/1
55 SPRAY, METERED NASAL
Qty: 3 | Refills: 3 | Status: COMPLETED | COMMUNITY
Start: 2018-12-10 | End: 2018-12-10

## 2018-12-10 RX ORDER — TRIAMCINOLONE ACETONIDE 55 UG/1
55 SPRAY, METERED NASAL
Qty: 1 | Refills: 3 | Status: ACTIVE | COMMUNITY
Start: 2018-12-10 | End: 1900-01-01

## 2018-12-17 RX ORDER — IBUPROFEN 800 MG
TABLET ORAL
Refills: 0 | Status: ACTIVE | COMMUNITY

## 2018-12-17 RX ORDER — KETOROLAC TROMETHAMINE 10 MG/1
10 TABLET, FILM COATED ORAL EVERY 8 HOURS
Qty: 30 | Refills: 0 | Status: DISCONTINUED | COMMUNITY
Start: 2018-11-20 | End: 2018-12-17

## 2019-02-26 ENCOUNTER — FORM ENCOUNTER (OUTPATIENT)
Age: 59
End: 2019-02-26

## 2019-02-27 ENCOUNTER — APPOINTMENT (OUTPATIENT)
Dept: MRI IMAGING | Facility: CLINIC | Age: 59
End: 2019-02-27
Payer: COMMERCIAL

## 2019-02-27 ENCOUNTER — OUTPATIENT (OUTPATIENT)
Dept: OUTPATIENT SERVICES | Facility: HOSPITAL | Age: 59
LOS: 1 days | End: 2019-02-27
Payer: COMMERCIAL

## 2019-02-27 DIAGNOSIS — Z95.1 PRESENCE OF AORTOCORONARY BYPASS GRAFT: Chronic | ICD-10-CM

## 2019-02-27 DIAGNOSIS — Z90.89 ACQUIRED ABSENCE OF OTHER ORGANS: Chronic | ICD-10-CM

## 2019-02-27 DIAGNOSIS — D35.2 BENIGN NEOPLASM OF PITUITARY GLAND: ICD-10-CM

## 2019-02-27 PROCEDURE — 70553 MRI BRAIN STEM W/O & W/DYE: CPT | Mod: 26

## 2019-02-27 PROCEDURE — 70553 MRI BRAIN STEM W/O & W/DYE: CPT

## 2019-02-27 PROCEDURE — A9585: CPT

## 2019-03-13 ENCOUNTER — OTHER (OUTPATIENT)
Age: 59
End: 2019-03-13

## 2019-03-13 ENCOUNTER — TRANSCRIPTION ENCOUNTER (OUTPATIENT)
Age: 59
End: 2019-03-13

## 2019-03-13 DIAGNOSIS — R53.83 OTHER FATIGUE: ICD-10-CM

## 2019-03-29 ENCOUNTER — RX RENEWAL (OUTPATIENT)
Age: 59
End: 2019-03-29

## 2019-03-29 DIAGNOSIS — N52.9 MALE ERECTILE DYSFUNCTION, UNSPECIFIED: ICD-10-CM

## 2019-07-11 ENCOUNTER — RX RENEWAL (OUTPATIENT)
Age: 59
End: 2019-07-11

## 2019-10-08 ENCOUNTER — RX RENEWAL (OUTPATIENT)
Age: 59
End: 2019-10-08

## 2019-10-24 ENCOUNTER — OTHER (OUTPATIENT)
Age: 59
End: 2019-10-24

## 2019-11-13 ENCOUNTER — APPOINTMENT (OUTPATIENT)
Dept: MRI IMAGING | Facility: CLINIC | Age: 59
End: 2019-11-13

## 2019-11-20 ENCOUNTER — APPOINTMENT (OUTPATIENT)
Dept: ENDOCRINOLOGY | Facility: CLINIC | Age: 59
End: 2019-11-20
Payer: COMMERCIAL

## 2019-11-20 VITALS
HEART RATE: 70 BPM | HEIGHT: 72 IN | BODY MASS INDEX: 24.65 KG/M2 | WEIGHT: 182 LBS | DIASTOLIC BLOOD PRESSURE: 100 MMHG | OXYGEN SATURATION: 98 % | SYSTOLIC BLOOD PRESSURE: 134 MMHG

## 2019-11-20 PROCEDURE — 99214 OFFICE O/P EST MOD 30 MIN: CPT | Mod: 25

## 2019-11-20 PROCEDURE — 36415 COLL VENOUS BLD VENIPUNCTURE: CPT

## 2019-11-20 NOTE — HISTORY OF PRESENT ILLNESS
[FreeTextEntry1] : Mr. HOROWITZ  is a 59 year  year old male  who returns endocrine evaluation been seen at my oprior office. He presents with regard to a history off endoscopic removal of a pituitary macroadenoma.about 1 yr ago.  Did have f/u mri today ordered per Dr. Aquino of [place]. . MRi of brain from 7/2018 did demonstrate a 1.6 cm pituitary.  Apparently the adenoma was non secretory.adenoma. Additional medical history includes that of  cad, s/p cabg and hypertension along with hyperlipidemia. Visual fields pre and post surgery have been normal per Dr. hurtado, opthal.  DOes have hx of hyperlipidemia and ED along with hx is on Lipitor  40 mg and Toprol along with xanax and asa. Deneis headaches.\par the pituitary adenoma was initailly dx when he had bells palsy and headaches -mri showed the adenoma.  Neurol is Dr. Jhonatan fatima.  SOme hx ED after toprol.  Has been on daiky cialis 5 mg and has tolerated well.\par Current medications include\par

## 2019-11-20 NOTE — PHYSICAL EXAM
[Alert] : alert [No Acute Distress] : no acute distress [Well Developed] : well developed [Well Nourished] : well nourished [Normal Sclera/Conjunctiva] : normal sclera/conjunctiva [No Proptosis] : no proptosis [EOMI] : extra ocular movement intact [Thyroid Not Enlarged] : the thyroid was not enlarged [No Thyroid Nodules] : there were no palpable thyroid nodules [Normal Oropharynx] : the oropharynx was normal [Clear to Auscultation] : lungs were clear to auscultation bilaterally [No Respiratory Distress] : no respiratory distress [No Accessory Muscle Use] : no accessory muscle use [Normal Rate] : heart rate was normal  [Normal S1, S2] : normal S1 and S2 [No Edema] : there was no peripheral edema [Regular Rhythm] : with a regular rhythm [Pedal Pulses Normal] : the pedal pulses are present [Normal Bowel Sounds] : normal bowel sounds [Not Tender] : non-tender [Soft] : abdomen soft [Axillary Nodes] : axillary nodes [Post Cervical Nodes] : posterior cervical nodes [Not Distended] : not distended [Anterior Cervical Nodes] : anterior cervical nodes [Normal] : normal and non tender [No Spinal Tenderness] : no spinal tenderness [No Stigmata of Cushings Syndrome] : no stigmata of cushings syndrome [Normal Gait] : normal gait [Spine Straight] : spine straight [No Rash] : no rash [Normal Strength/Tone] : muscle strength and tone were normal [Normal Reflexes] : deep tendon reflexes were 2+ and symmetric [No Tremors] : no tremors [Oriented x3] : oriented to person, place, and time [Acanthosis Nigricans] : no acanthosis nigricans

## 2019-11-25 LAB
ACTH SER-ACNC: 16.2 PG/ML
ALBUMIN SERPL ELPH-MCNC: 4.7 G/DL
ALP BLD-CCNC: 76 U/L
ALT SERPL-CCNC: 39 U/L
ANION GAP SERPL CALC-SCNC: 15 MMOL/L
AST SERPL-CCNC: 26 U/L
BASOPHILS # BLD AUTO: 0.01 K/UL
BASOPHILS NFR BLD AUTO: 0.2 %
BILIRUB SERPL-MCNC: 0.9 MG/DL
BUN SERPL-MCNC: 16 MG/DL
CALCIUM SERPL-MCNC: 9.7 MG/DL
CHLORIDE SERPL-SCNC: 100 MMOL/L
CO2 SERPL-SCNC: 24 MMOL/L
CREAT SERPL-MCNC: 0.96 MG/DL
EOSINOPHIL # BLD AUTO: 0.06 K/UL
EOSINOPHIL NFR BLD AUTO: 1.1 %
FSH SERPL-MCNC: 4.5 IU/L
GH SERPL-MCNC: 0.99 NG/ML
GLUCOSE SERPL-MCNC: 91 MG/DL
HCT VFR BLD CALC: 43.6 %
HGB BLD-MCNC: 14.3 G/DL
IGF-1 INTERP: NORMAL
IGF-I BLD-MCNC: 111 NG/ML
IMM GRANULOCYTES NFR BLD AUTO: 0 %
LH SERPL-ACNC: 4.7 IU/L
LYMPHOCYTES # BLD AUTO: 1.5 K/UL
LYMPHOCYTES NFR BLD AUTO: 28.7 %
MAN DIFF?: NORMAL
MCHC RBC-ENTMCNC: 28.7 PG
MCHC RBC-ENTMCNC: 32.8 GM/DL
MCV RBC AUTO: 87.6 FL
MONOCYTES # BLD AUTO: 0.5 K/UL
MONOCYTES NFR BLD AUTO: 9.6 %
NEUTROPHILS # BLD AUTO: 3.16 K/UL
NEUTROPHILS NFR BLD AUTO: 60.4 %
PLATELET # BLD AUTO: 192 K/UL
POTASSIUM SERPL-SCNC: 4.8 MMOL/L
PROLACTIN SERPL-MCNC: 5.5 NG/ML
PROT SERPL-MCNC: 7.7 G/DL
RBC # BLD: 4.98 M/UL
RBC # FLD: 12.7 %
SODIUM SERPL-SCNC: 139 MMOL/L
T3FREE SERPL-MCNC: 3.19 PG/ML
T4 FREE SERPL-MCNC: 1.3 NG/DL
TSH SERPL-ACNC: 0.98 UIU/ML
WBC # FLD AUTO: 5.23 K/UL

## 2019-12-05 ENCOUNTER — APPOINTMENT (OUTPATIENT)
Dept: NEUROSURGERY | Facility: CLINIC | Age: 59
End: 2019-12-05
Payer: COMMERCIAL

## 2019-12-05 VITALS
RESPIRATION RATE: 18 BRPM | SYSTOLIC BLOOD PRESSURE: 123 MMHG | DIASTOLIC BLOOD PRESSURE: 82 MMHG | OXYGEN SATURATION: 98 % | BODY MASS INDEX: 24.65 KG/M2 | HEIGHT: 72 IN | WEIGHT: 182 LBS | HEART RATE: 72 BPM

## 2019-12-05 PROCEDURE — 99024 POSTOP FOLLOW-UP VISIT: CPT

## 2019-12-06 NOTE — RESULTS/DATA
[FreeTextEntry1] : Pt here for a follow up appt s/p [facility] follow up  Pt doing well    If any new neuro deficits or mental status changes call the office  Will follow up yearly but call the office as needed

## 2019-12-06 NOTE — REASON FOR VISIT
[Follow-Up: _____] : a [unfilled] follow-up visit [FreeTextEntry1] : Reason For Visit\par \par DARRYN HOROWITZ is status post S/P Endoscopic resection of Pituitary Macroadenoma and he is here for a post-op visit. \par Surgery Date: 11/13/18 Doing well\par No Endocrine symptoms.\par Remains with periodic headache pain; Off Toradol and now taking Motrin PRN with relief. \par \par Pt is here for follow up office visit s/p [facility]  Had post op imaging  Dr Aquino reviewed with the patient  Pt has no new complaints  Vision intact\par Pt will follow up yearly but if any new deficits will call the office to make an appt

## 2019-12-06 NOTE — PHYSICAL EXAM
[General Appearance - In No Acute Distress] : in no acute distress [General Appearance - Alert] : alert [Affect] : the affect was normal [Oriented To Time, Place, And Person] : oriented to person, place, and time [Impaired Insight] : insight and judgment were intact [Person] : oriented to person [Place] : oriented to place [Time] : oriented to time [Short Term Intact] : short term memory intact [Remote Intact] : remote memory intact [Span Intact] : the attention span was normal [Concentration Intact] : normal concentrating ability [Comprehension] : comprehension intact [Fluency] : fluency intact [Current Events] : adequate knowledge of current events [Vocabulary] : adequate range of vocabulary [Past History] : adequate knowledge of personal past history [Cranial Nerves Optic (II)] : visual acuity intact bilaterally,  pupils equal round and reactive to light [Cranial Nerves Oculomotor (III)] : extraocular motion intact [Cranial Nerves Trigeminal (V)] : facial sensation intact symmetrically [Cranial Nerves Facial (VII)] : face symmetrical [Cranial Nerves Vestibulocochlear (VIII)] : hearing was intact bilaterally [Cranial Nerves Accessory (XI - Cranial And Spinal)] : head turning and shoulder shrug symmetric [Cranial Nerves Hypoglossal (XII)] : there was no tongue deviation with protrusion [Cranial Nerves Glossopharyngeal (IX)] : tongue and palate midline [Motor Strength] : muscle strength was normal in all four extremities [No Muscle Atrophy] : normal bulk in all four extremities [Sensation Tactile Decrease] : light touch was intact [Balance] : balance was intact [2+] : Patella left 2+ [No Visual Abnormalities] : no visible abnormailities [No Tenderness to Palpation] : no spine tenderness on palpation [Full ROM] : full ROM [No Pain with ROM] : no pain with motion in any direction [Normal] : normal [Intact] : all reflexes within normal limits bilaterally [Sclera] : the sclera and conjunctiva were normal [Extraocular Movements] : extraocular movements were intact [PERRL With Normal Accommodation] : pupils were equal in size, round, reactive to light, with normal accommodation [Outer Ear] : the ears and nose were normal in appearance [Neck Appearance] : the appearance of the neck was normal [Oropharynx] : the oropharynx was normal [Thyroid Diffuse Enlargement] : the thyroid was not enlarged [Jugular Venous Distention Increased] : there was no jugular-venous distention [Neck Cervical Mass (___cm)] : no neck mass was observed [Thyroid Nodule] : there were no palpable thyroid nodules [Auscultation Breath Sounds / Voice Sounds] : lungs were clear to auscultation bilaterally [Heart Sounds] : normal S1 and S2 [Heart Rate And Rhythm] : heart rate was normal and rhythm regular [Murmurs] : no murmurs [Heart Sounds Gallop] : no gallops [Bowel Sounds] : normal bowel sounds [Heart Sounds Pericardial Friction Rub] : no pericardial rub [Abdomen Tenderness] : non-tender [Abdomen Soft] : soft [Abdomen Mass (___ Cm)] : no abdominal mass palpated [No CVA Tenderness] : no ~M costovertebral angle tenderness [Abnormal Walk] : normal gait [No Spinal Tenderness] : no spinal tenderness [Motor Tone] : muscle strength and tone were normal [Skin Color & Pigmentation] : normal skin color and pigmentation [Nail Clubbing] : no clubbing  or cyanosis of the fingernails [Musculoskeletal - Swelling] : no joint swelling seen [] : no rash [Skin Turgor] : normal skin turgor [Past-pointing] : there was no past-pointing [Tremor] : no tremor present [L'Hermitte's] : neck flexion did not produce tingling down the spine/arms [Spurling's - Opposite Side] : Negative Spurling's on opposite side [Spurling's Same Side] : Negative Spurling's on same side [Straight-Leg Raise Test - Left] : straight leg raise of the left leg was negative [Straight-Leg Raise Test - Right] : straight leg raise  of the right leg was negative

## 2020-04-26 ENCOUNTER — MESSAGE (OUTPATIENT)
Age: 60
End: 2020-04-26

## 2020-05-08 ENCOUNTER — APPOINTMENT (OUTPATIENT)
Dept: DISASTER EMERGENCY | Facility: CLINIC | Age: 60
End: 2020-05-08

## 2020-05-08 LAB
SARS-COV-2 IGG SERPL IA-ACNC: 6.7 INDEX
SARS-COV-2 IGG SERPL QL IA: POSITIVE

## 2020-06-11 ENCOUNTER — APPOINTMENT (OUTPATIENT)
Dept: ENDOCRINOLOGY | Facility: CLINIC | Age: 60
End: 2020-06-11

## 2021-09-20 NOTE — PHYSICAL THERAPY INITIAL EVALUATION ADULT - GENERAL OBSERVATIONS, REHAB EVAL
Rec'd pt supine in bed, in no acute distress, KEVAN gilliland present and aware, cleared for PT by KEVAN gilliland and Abdiaziz ERNST. + right nasal packing.
Patient requests all Lab, Cardiology, and Radiology Results on their Discharge Instructions

## 2021-11-10 ENCOUNTER — NON-APPOINTMENT (OUTPATIENT)
Age: 61
End: 2021-11-10

## 2021-12-15 ENCOUNTER — APPOINTMENT (OUTPATIENT)
Dept: ENDOCRINOLOGY | Facility: CLINIC | Age: 61
End: 2021-12-15
Payer: COMMERCIAL

## 2021-12-15 VITALS
RESPIRATION RATE: 15 BRPM | SYSTOLIC BLOOD PRESSURE: 124 MMHG | DIASTOLIC BLOOD PRESSURE: 70 MMHG | OXYGEN SATURATION: 99 % | HEART RATE: 72 BPM | TEMPERATURE: 98.6 F | BODY MASS INDEX: 24.92 KG/M2 | HEIGHT: 72 IN | WEIGHT: 184 LBS

## 2021-12-15 PROCEDURE — 99214 OFFICE O/P EST MOD 30 MIN: CPT

## 2021-12-23 NOTE — HISTORY OF PRESENT ILLNESS
[FreeTextEntry1] : Mr. HOROWITZ is a 61 year old male who returns today endocrine followup. He returns with regard to a history off endoscopic removal of a pituitary macroadenoma about 3 yr ago in 2018. Did have f/u mri ordered per Dr. Aquino of [place] and report from 11/2019 showed no evidence of recurrent and residual disease. He had bells palsy and headaches which prompted further evaluation and MRI of brain from 7/2018 had originally did demonstrated a 1.6 cm pituitary. Apparently the adenoma was non secretory adenoma. Denies any weight changes, chest pain, sob, palpitations, tremors, weakness, fatigue, constipation or diarrhea. denies visual changes and  Denies headaches.  Visual fields pre and post surgery have been normal per Dr. hurtado, opthal\par \par Additional medical history includes that of cad, s/p cabg and hypertension along with hyperlipidemia.\par medications include amlodipine,  Lipitor 40 mg and Toprol along with xanax and asa and Vit D possibly 5000IU daily \par Some hx ED after Toprol. Has been on daily Cialis 5 mg and has tolerated well.\par \par Denies hospitalizations, surgeries, or infections \par Had COVID infection from 04/01/2020. Had residual loss of taste and smell. Much improved as of late.\par \par PCP: Dr. Arnoldo Ayala

## 2021-12-29 ENCOUNTER — NON-APPOINTMENT (OUTPATIENT)
Age: 61
End: 2021-12-29

## 2022-03-03 DIAGNOSIS — R17 UNSPECIFIED JAUNDICE: ICD-10-CM

## 2022-08-01 NOTE — PHYSICAL THERAPY INITIAL EVALUATION ADULT - ADDITIONAL COMMENTS
no
Pt lives at home alone with 2 flight walk up, no elevator, states sister will be staying upon d/c. PTA: indep with functional mobility, no AD. Right hand dominant, eyeglasses for reading.

## 2022-10-21 ENCOUNTER — APPOINTMENT (OUTPATIENT)
Dept: ENDOCRINOLOGY | Facility: CLINIC | Age: 62
End: 2022-10-21

## 2022-10-21 VITALS
HEIGHT: 72 IN | WEIGHT: 170 LBS | OXYGEN SATURATION: 99 % | DIASTOLIC BLOOD PRESSURE: 76 MMHG | SYSTOLIC BLOOD PRESSURE: 118 MMHG | TEMPERATURE: 98.6 F | HEART RATE: 74 BPM | BODY MASS INDEX: 23.03 KG/M2 | RESPIRATION RATE: 15 BRPM

## 2022-10-21 DIAGNOSIS — I10 ESSENTIAL (PRIMARY) HYPERTENSION: ICD-10-CM

## 2022-10-21 DIAGNOSIS — N52.9 MALE ERECTILE DYSFUNCTION, UNSPECIFIED: ICD-10-CM

## 2022-10-21 DIAGNOSIS — E78.5 HYPERLIPIDEMIA, UNSPECIFIED: ICD-10-CM

## 2022-10-21 DIAGNOSIS — D35.2 BENIGN NEOPLASM OF PITUITARY GLAND: ICD-10-CM

## 2022-10-21 DIAGNOSIS — E55.9 VITAMIN D DEFICIENCY, UNSPECIFIED: ICD-10-CM

## 2022-10-21 PROCEDURE — 99214 OFFICE O/P EST MOD 30 MIN: CPT

## 2022-10-21 RX ORDER — ATORVASTATIN CALCIUM 40 MG/1
40 TABLET, FILM COATED ORAL
Refills: 0 | Status: DISCONTINUED | COMMUNITY
End: 2022-10-21

## 2022-10-21 RX ORDER — TADALAFIL 5 MG/1
5 TABLET ORAL
Qty: 90 | Refills: 1 | Status: ACTIVE | COMMUNITY
Start: 2019-03-29 | End: 1900-01-01

## 2022-10-22 PROBLEM — N52.9 ERECTILE DYSFUNCTION: Status: ACTIVE | Noted: 2019-11-20

## 2022-10-22 PROBLEM — D35.2 PITUITARY MACROADENOMA: Status: ACTIVE | Noted: 2018-08-29

## 2022-10-22 PROBLEM — E55.9 VITAMIN D DEFICIENCY: Status: ACTIVE | Noted: 2019-03-13

## 2022-10-22 PROBLEM — I10 HYPERTENSION: Status: ACTIVE | Noted: 2022-10-22

## 2022-10-22 NOTE — HISTORY OF PRESENT ILLNESS
[FreeTextEntry1] : Mr. HOROWITZ is a 61 year old male who returns today endocrine followup. He returns with regard to a history off endoscopic removal of a pituitary macroadenoma about 3 yr ago in 2018. Did have f/u mri ordered per Dr. Aquino of [place] and report from 11/2019 showed no evidence of recurrent and residual disease. \par \par He had bells palsy and headaches which prompted further evaluation and MRI of brain from 7/2018 had originally did demonstrated a 1.6 cm pituitary. Apparently the adenoma was non secretory adenoma. Denies any weight changes, chest pain, sob, palpitations, tremors, weakness, fatigue, constipation or diarrhea. denies visual changes and Denies headaches. Visual fields pre and post surgery have been normal per Dr. hurtado, opthal\par Too, pituitary hormones have remained in age appropriate levels post surgery.\par \par A1c returned on 2/24/22 at 6.3%. \par \par Additional medical history includes that of cad, s/p cabg and hypertension along with hyperlipidemia.\par medications include amlodipine, Crestor 40 mg and Toprol along with xanax and asa and Vit D 2, 000 IU daily. \par \par Some hx ED after Toprol. Has been on daily Cialis 5 mg and has tolerated well.\par \par Denies hospitalizations, surgeries, or infections \par Had COVID infection from 04/01/2020. Had residual loss of taste and smell. Much improved as of late.\par \par PCP: Dr. Arnoldo Ayala \par

## 2022-11-30 NOTE — DISCHARGE NOTE ADULT - NSTOBACCOHOTLINE_GEN_A_NCS
[facility] Smokers Quitline ([id number]) Rinvoq Pregnancy And Lactation Text: Based on animal studies, Rinvoq may cause embryo-fetal harm when administered to pregnant women.  The medication should not be used in pregnancy.  Breastfeeding is not recommended during treatment and for 6 days after the last dose.

## 2023-03-10 ENCOUNTER — NON-APPOINTMENT (OUTPATIENT)
Age: 63
End: 2023-03-10

## 2023-10-31 NOTE — DISCHARGE NOTE ADULT - CONTRAINDICATIONS & PRECAUTIONS (SELECT ALL THAT APPLY)
Detail Level: Detailed Quality 431: Preventive Care And Screening: Unhealthy Alcohol Use - Screening: Patient not identified as an unhealthy alcohol user when screened for unhealthy alcohol use using a systematic screening method Quality 130: Documentation Of Current Medications In The Medical Record: Current Medications Documented Quality 226: Preventive Care And Screening: Tobacco Use: Screening And Cessation Intervention: Patient screened for tobacco use, is a smoker AND received Cessation Counseling within measurement period or in the six months prior to the measurement period Patient/surrogate refused vaccine...

## 2024-04-16 RX ORDER — METOPROLOL TARTRATE 50 MG
1 TABLET ORAL
Qty: 0 | Refills: 0 | DISCHARGE

## 2024-04-16 RX ORDER — RAMIPRIL 5 MG
1 CAPSULE ORAL
Qty: 0 | Refills: 0 | DISCHARGE

## 2024-04-16 RX ORDER — ATORVASTATIN CALCIUM 80 MG/1
1 TABLET, FILM COATED ORAL
Qty: 0 | Refills: 0 | DISCHARGE

## 2024-04-16 RX ORDER — ZOLPIDEM TARTRATE 10 MG/1
1 TABLET ORAL
Qty: 0 | Refills: 0 | DISCHARGE

## 2024-04-16 RX ORDER — ASPIRIN/CALCIUM CARB/MAGNESIUM 324 MG
1 TABLET ORAL
Qty: 0 | Refills: 0 | DISCHARGE

## 2024-04-16 RX ORDER — HYDROCHLOROTHIAZIDE 25 MG
1 TABLET ORAL
Qty: 0 | Refills: 0 | DISCHARGE

## 2024-04-16 RX ORDER — ALPRAZOLAM 0.25 MG
1 TABLET ORAL
Qty: 0 | Refills: 0 | DISCHARGE

## 2025-06-08 ENCOUNTER — NON-APPOINTMENT (OUTPATIENT)
Age: 65
End: 2025-06-08